# Patient Record
Sex: MALE | Race: WHITE
[De-identification: names, ages, dates, MRNs, and addresses within clinical notes are randomized per-mention and may not be internally consistent; named-entity substitution may affect disease eponyms.]

---

## 2017-01-03 ENCOUNTER — HOSPITAL ENCOUNTER (OUTPATIENT)
Dept: HOSPITAL 47 - ORPAIN | Age: 57
Discharge: HOME | End: 2017-01-03
Payer: COMMERCIAL

## 2017-01-03 VITALS — TEMPERATURE: 98.3 F | RESPIRATION RATE: 16 BRPM

## 2017-01-03 VITALS — HEART RATE: 69 BPM | SYSTOLIC BLOOD PRESSURE: 113 MMHG | DIASTOLIC BLOOD PRESSURE: 67 MMHG

## 2017-01-03 DIAGNOSIS — M47.816: Primary | ICD-10-CM

## 2017-01-03 PROCEDURE — 64635 DESTROY LUMB/SAC FACET JNT: CPT

## 2017-01-03 PROCEDURE — 64636 DESTROY L/S FACET JNT ADDL: CPT

## 2017-01-03 NOTE — FL
EXAMINATION TYPE: FL guided pain mgmt statistic

 

DATE OF EXAM: 1/3/2017 10:06 AM

 

COMPARISON: NONE

 

HISTORY: Back pain

 

Fluoroscopy support supplied to the referring clinician.  See dictated report from anesthesia, 14 sec
onds fluoroscopy time supplied, intraoperative C-arm image documents the procedure

## 2017-01-03 NOTE — P.PCN
Date of Procedure: 01/03/17


Preoperative Diagnosis: 


Lumbar spondylosis without myelopathy


Postoperative Diagnosis: 


Lumbar spondylosis without myelopathy or


Procedure(s) Performed: 


Left lumbar medial branch radiofrequency ablation under fluoroscopic guidance


Implants: 





Anesthesia: MAC


Surgeon: Mika Radford


Pathology: none sent


Condition: stable


Disposition: PACU


Indications for Procedure: 





Operative Findings: 





Description of Procedure: 


The patient was seen in preoperative holding area consent was obtained and was 

brought into the procedure room and placed in prone position.  Skin was prepped 

with ChloraPrep and draped in a sterile manner.  Lidocaine 1% was used to numb 

the skin over the target points that were chosen as follows: For the L5-S1 

level which corresponds to the dorsal ramus of L5 the target point was at the 

superior medial aspect of the sacral ala on the left side of the spine on the 

AP view of fluoroscopy.  For the L2-L3 and L4 medial branches the target points 

were the connection between the transverse process and the superior articular 

process of L3,L4 and L5 vertebra respectively on the left oblique view of 

fluoroscopy.  I Used 18  gauge 100 mm in length with 10 mm curved active tip 

radiofrequency ablation needles for this procedure.  I tried to place the 

active tips as parallel as possible to the medial branches tracks by going in a 

superior medial direction.  AP oblique and lateral views of fluoroscopy were 

obtained to verify needle tips position.  Motor stimulation showed only local 

twitches of these needles with no radiation of twitching to the left lower 

extremity after that I injected 1 mL of a solution made up of 3 mils Marcaine 

0.5% +40 mg of Kenalog.  Radio frequency ablation was started for 2 sessions 

for 90 seconds at 80C the needles were withdrawn by 1 or 2 mm and turning 180 

after the first session of ablation.  Patient tolerated procedure well.

## 2017-02-02 ENCOUNTER — HOSPITAL ENCOUNTER (OUTPATIENT)
Dept: HOSPITAL 47 - PNWHC3 | Age: 57
Discharge: HOME | End: 2017-02-02
Payer: COMMERCIAL

## 2017-02-02 VITALS
DIASTOLIC BLOOD PRESSURE: 79 MMHG | SYSTOLIC BLOOD PRESSURE: 140 MMHG | RESPIRATION RATE: 16 BRPM | TEMPERATURE: 98 F | HEART RATE: 70 BPM

## 2017-02-02 DIAGNOSIS — G89.29: Primary | ICD-10-CM

## 2017-02-02 DIAGNOSIS — M51.36: ICD-10-CM

## 2017-02-02 DIAGNOSIS — Z79.891: ICD-10-CM

## 2017-02-02 DIAGNOSIS — M47.816: ICD-10-CM

## 2017-02-02 DIAGNOSIS — M46.96: ICD-10-CM

## 2017-02-02 PROCEDURE — 99211 OFF/OP EST MAY X REQ PHY/QHP: CPT

## 2017-02-03 NOTE — P.PN
Subjective


   This is follow-up visit for this patient with a history of severe and 

chronic low back pain secondary to lumbar degenerative disc disease, lumbar 

facet arthropathy, we have done interventional pain management injection, 

radiofrequency ablation of the medial branch lumbar area, and this helped his 

low back pain significantly, and is currently on pain medications 


      1-MS Contin 60 mg every 8 hours      2-Percocet 5/325 every 6 hours when 

necessary for breakthrough pain      


     Patient denies any side effects of the medication, denies excessive 

drowsiness or sleepiness, denies suicidal ideation, and reports that the 

current pain medication is NOT helping  To  control the  pain and improve 

activity of daily living 


      the VAS  8 /10  without the medications ,and it  drope  to   3-4 /10 with 

the medication


    


     Physical Examinations  :


    1-Constitutiona           : Cooperative , not in acute distress .


     2-HEENT                  :  nech ;  supple ,  no Lymphadenopathy  , no 

Thyromegaly  , normal  thyroid  size .


                                                     eyes  :  no ptosis , no 

icterus,  no photophobia .  


                                                      ENT  : normal    of 

hearing  , normal  oropharynx     , no Thrush .  


    3- Respiratory            : Chest clear to auscultations Bilaterally  ,  no 

wheezing   , no Rhonchi   .  


    4- Cardiovascular      :  regular rate and rhythem , S1 ,  S2  ,   no  S3 ,

  no  S4.


    5- Gastrointestinal     : abdomen soft  no tenderness , bowel sounds 

positive all four quadrents   , no organomegally  .


    6- Genitourinary        :  Defferred .                                     

                                                                               

                                                                               

                                                                               

                                                                               

                   


    7- neurologic            :  Cranial nerve II   to  XII  intact ,  no   

focal neurological deffecit  .


    8-psychatric              : alert ,  oriented  X 3  ,   appropriate affect 

  , intact judgment  and insight  .  


   9-Lymphatic               :  no Lymphadenopathy .


  10- musculoskeltal      :     


        exams of the cervical spine =  motor strength normal  bilateral upper 

extremities 


                                                        facet loading test 

cervical area   positive.                                                      

                                                                               

                                                                               

                                                                               

                                                                               

                                            


        exams of the Lumber spine  =   motor strength  lower extremities ,thigh 

and legs  .4/5 


        deep tendon reflexes :   normal  Knee Jerk    , normal   ankle Jerk  .


        lumber facet Loading Test  positive 


        strait leg raising test   positive at  30  degree , RT ,LT   ,  


         Fabere test positive RT    and  positive  LT .


         Range of motion: Range of motion in flexion of the lumbar spine  30 

degrees


         Range of motion range of motion of extension of the lumbar spine 10





         Assessment and plan =


-        Chronic low back pain secondary to lumbar degenerative disc disease , 

lumbar spondylosis with facet arthropathy without myelopathy , 


-        chronic and current use of high-risk medication (Opioids).


         The patient was counseled about risk of opioid use, psychological risk 

associated with opioids and was orally counseled to not overuse , abuse , divert

,or sell dictations to take medications as prescribed only , and to restore 

medication in  safe location  ,                         


          and  the  patient counseled against driving while using narcotic 

medications, and also not to use alcohol or any illicit recreational drugs the 

patient's verbalized understanding that the lack of compliance will result in 

failure to renew narcotic prescription 


          and    possible discharge from the clinic                            

                                                                               

                                                                               

                                                                               

                                                                               

                                                                               

                                                                               

                                                                               

                                                     -       diagnoses, 

prognosis, and treatment options including but not limited to physical therapy, 

surgical interventions, interventional therapies and medication management 

including narcotics and adjuvant medication  were discussed with the patient 

and all    


         The questions answered





          -medication management =1-MS Contin  60 mg every 8 hours  dispense 90 

with 1 refill      2-  Percocet 5/325 every 6 hours dispensed 120 with one 

refill .  Next visit we'll do urine drug screen      


                                  


  








                                                  














Objective





- Vital Signs


Vital signs: 


 Vital Signs











Temp  98 F   02/02/17 14:27


 


Pulse  70   02/02/17 14:27


 


Resp  16   02/02/17 14:27


 


BP  140/79   02/02/17 14:27


 


Pulse Ox  97   02/02/17 14:27

## 2017-04-26 ENCOUNTER — HOSPITAL ENCOUNTER (OUTPATIENT)
Dept: HOSPITAL 47 - PNWHC3 | Age: 57
Discharge: HOME | End: 2017-04-26
Payer: COMMERCIAL

## 2017-04-26 VITALS
TEMPERATURE: 97.4 F | HEART RATE: 90 BPM | DIASTOLIC BLOOD PRESSURE: 83 MMHG | SYSTOLIC BLOOD PRESSURE: 141 MMHG | RESPIRATION RATE: 18 BRPM

## 2017-04-26 DIAGNOSIS — Z79.899: ICD-10-CM

## 2017-04-26 DIAGNOSIS — G89.29: ICD-10-CM

## 2017-04-26 DIAGNOSIS — Z79.891: ICD-10-CM

## 2017-04-26 DIAGNOSIS — M54.5: Primary | ICD-10-CM

## 2017-04-26 PROCEDURE — 99211 OFF/OP EST MAY X REQ PHY/QHP: CPT

## 2017-04-26 NOTE — P.PN
Progress Note - Text





This is a 57-year-old gentleman with history of chronic lower back pain with 

occasional radiation to the lower extremities.  The patient has failed back 

surgery syndrome.  His pain has been well-controlled with a combination of 

intervention pain procedures and oral opioids.  He has been on MS Contin 60 mg 

3 times a day and Percocet 5 mg up to 5 times a day for long time.  He denies 

any side effects to these medications he does not show any drug-seeking 

behavior.  He doesn't look oversedated.  He denies any suicidal thoughts..


Today I will continue her this pain treatment with MS Contin and Percocet with 

no changes and we'll see him 2 months from now.

## 2017-06-13 ENCOUNTER — HOSPITAL ENCOUNTER (EMERGENCY)
Dept: HOSPITAL 47 - EC | Age: 57
LOS: 1 days | Discharge: HOME | End: 2017-06-14
Payer: COMMERCIAL

## 2017-06-13 VITALS — RESPIRATION RATE: 18 BRPM

## 2017-06-13 DIAGNOSIS — Z90.49: ICD-10-CM

## 2017-06-13 DIAGNOSIS — Z79.1: ICD-10-CM

## 2017-06-13 DIAGNOSIS — Z87.442: ICD-10-CM

## 2017-06-13 DIAGNOSIS — Z79.899: ICD-10-CM

## 2017-06-13 DIAGNOSIS — R30.0: ICD-10-CM

## 2017-06-13 DIAGNOSIS — F17.290: ICD-10-CM

## 2017-06-13 DIAGNOSIS — R10.9: Primary | ICD-10-CM

## 2017-06-13 LAB
ALP SERPL-CCNC: 82 U/L (ref 38–126)
ALT SERPL-CCNC: 48 U/L (ref 21–72)
AMYLASE SERPL-CCNC: 73 U/L (ref 30–110)
ANION GAP SERPL CALC-SCNC: 12 MMOL/L
AST SERPL-CCNC: 31 U/L (ref 17–59)
BASOPHILS # BLD AUTO: 0 K/UL (ref 0–0.2)
BASOPHILS NFR BLD AUTO: 1 %
BUN SERPL-SCNC: 16 MG/DL (ref 9–20)
CALCIUM SPEC-MCNC: 9.4 MG/DL (ref 8.4–10.2)
CH: 33.7
CHCM: 36.7
CHLORIDE SERPL-SCNC: 105 MMOL/L (ref 98–107)
CO2 SERPL-SCNC: 23 MMOL/L (ref 22–30)
EOSINOPHIL # BLD AUTO: 0.2 K/UL (ref 0–0.7)
EOSINOPHIL NFR BLD AUTO: 3 %
ERYTHROCYTE [DISTWIDTH] IN BLOOD BY AUTOMATED COUNT: 4.24 M/UL (ref 4.3–5.9)
ERYTHROCYTE [DISTWIDTH] IN BLOOD: 12.9 % (ref 11.5–15.5)
GLUCOSE SERPL-MCNC: 106 MG/DL (ref 74–99)
HCT VFR BLD AUTO: 39.1 % (ref 39–53)
HDW: 3.01
HGB BLD-MCNC: 14.1 GM/DL (ref 13–17.5)
LUC NFR BLD AUTO: 3 %
LYMPHOCYTES # SPEC AUTO: 1.9 K/UL (ref 1–4.8)
LYMPHOCYTES NFR SPEC AUTO: 31 %
MCH RBC QN AUTO: 33.3 PG (ref 25–35)
MCHC RBC AUTO-ENTMCNC: 36.1 G/DL (ref 31–37)
MCV RBC AUTO: 92.3 FL (ref 80–100)
MONOCYTES # BLD AUTO: 0.3 K/UL (ref 0–1)
MONOCYTES NFR BLD AUTO: 4 %
NEUTROPHILS # BLD AUTO: 3.5 K/UL (ref 1.3–7.7)
NEUTROPHILS NFR BLD AUTO: 58 %
NON-AFRICAN AMERICAN GFR(MDRD): >60
POTASSIUM SERPL-SCNC: 4.1 MMOL/L (ref 3.5–5.1)
PROT SERPL-MCNC: 6.7 G/DL (ref 6.3–8.2)
SODIUM SERPL-SCNC: 140 MMOL/L (ref 137–145)
WBC # BLD AUTO: 0.16 10*3/UL
WBC # BLD AUTO: 6 K/UL (ref 3.8–10.6)
WBC (PEROX): 6.37

## 2017-06-13 PROCEDURE — 81001 URINALYSIS AUTO W/SCOPE: CPT

## 2017-06-13 PROCEDURE — 74000: CPT

## 2017-06-13 PROCEDURE — 85025 COMPLETE CBC W/AUTO DIFF WBC: CPT

## 2017-06-13 PROCEDURE — 80053 COMPREHEN METABOLIC PANEL: CPT

## 2017-06-13 PROCEDURE — 83690 ASSAY OF LIPASE: CPT

## 2017-06-13 PROCEDURE — 96374 THER/PROPH/DIAG INJ IV PUSH: CPT

## 2017-06-13 PROCEDURE — 96361 HYDRATE IV INFUSION ADD-ON: CPT

## 2017-06-13 PROCEDURE — 82150 ASSAY OF AMYLASE: CPT

## 2017-06-13 PROCEDURE — 96375 TX/PRO/DX INJ NEW DRUG ADDON: CPT

## 2017-06-13 PROCEDURE — 99285 EMERGENCY DEPT VISIT HI MDM: CPT

## 2017-06-13 PROCEDURE — 74176 CT ABD & PELVIS W/O CONTRAST: CPT

## 2017-06-13 PROCEDURE — 36415 COLL VENOUS BLD VENIPUNCTURE: CPT

## 2017-06-13 NOTE — XR
EXAM:

  XR Abdomen, 1 View

 

CLINICAL HISTORY:

  Reason: abdominal pain

 

TECHNIQUE:

  Frontal upright views of the abdomen/pelvis.

 

COMPARISON:

  No relevant prior studies available.

 

FINDINGS:

  Lower thorax:  There appears to be a small calcified left hilar node 

present.

  Gastrointestinal tract:  Mild to moderate amount of colonic stool 

throughout.  No grossly dilated bowel loops are seen although there do 

appear to be a few scattered air-fluid levels, probably colonic, on these 

upright views.

  Organs:  Right upper quadrant clips suggest cholecystectomy.

  Bones/joints:  Multilevel degenerative changes, with slight leftward 

curvature centered at L1-2.

 

IMPRESSION:     

1.  Nonspecific bowel gas pattern that includes scattered air-fluid 

levels without significant bowel distention to suggest obstruction, 

perhaps on the basis of ongoing ileus.  The findings could be correlated 

and followed clinically to guide further imaging follow-up as clinically 

indicated.

2.  No evidence of free air.
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
## 2017-06-14 VITALS — DIASTOLIC BLOOD PRESSURE: 78 MMHG | SYSTOLIC BLOOD PRESSURE: 110 MMHG | HEART RATE: 56 BPM | TEMPERATURE: 98.7 F

## 2017-06-14 LAB
PARTICLE COUNT: 3262
PH UR: 5 [PH] (ref 5–8)
RBC UR QL: 14 /HPF (ref 0–5)
SP GR UR: 1.02 (ref 1–1.03)
UA BILLING (MACRO VS. MICRO): (no result)
UROBILINOGEN UR QL STRIP: <2 MG/DL (ref ?–2)
WBC #/AREA URNS HPF: 1 /HPF (ref 0–5)

## 2017-06-14 NOTE — CT
EXAM:

  CT Abdomen and Pelvis Without Intravenous Contrast

 

CLINICAL HISTORY:

  Reason: Left flank pain

 

TECHNIQUE:

  Axial computed tomography images of the abdomen and pelvis without 

intravenous contrast.  CTDI is 14.90 mGy and DLP is 734.70 mGy-cm.  This 

CT exam was performed using one or more of the following dose reduction 

techniques: automated exposure control, adjustment of the mA and/or kV 

according to patient size, and/or use of iterative reconstruction 

technique.

 

COMPARISON:

  Earlier [facility].

 

FINDINGS:

  Lower thorax:  Minimal peripheral atelectasis or scarring at the lung 

bases.  Coronary artery calcification.  Mildly elevated left diaphragm.

 

 ABDOMEN:

  Liver:  Diffuse hepatic steatosis.

  Gallbladder and bile ducts:  Prior cholecystectomy.  No ductal dilation.

 

  Pancreas: Mild generalized fatty replacement of the pancreas.

  Spleen:  No splenomegaly.

  Adrenals:  Unremarkable.  No mass.

  Kidneys and ureters:  No obstructing stones.  No hydronephrosis.

  Stomach and bowel:  Dilated air and fluid-filled small bowel loops 

measuring up to 5.7 cm in craniocaudal span on coronal image 27, 

anteriorly within the mid and lower abdomen, where there is a small left 

supraumbilical ventral wall hernia that contains a small portion of one 

small bowel loop within, adjacent to which small bowel is nondilated.  

Likewise, remaining small bowel including distally within the pelvis and 

right lower quadrant, is nondilated, nor is the colon.

  Appendix:  No findings to suggest acute appendicitis.

 

 PELVIS:

  Bladder:  Unremarkable.  No stones.

  Reproductive:  Small calcification centrally within normal size 

prostate gland.

 

 ABDOMEN and PELVIS:

  Intraperitoneal space:  No free air.  No significant fluid collection.

  Bones/joints:  Multilevel degenerative changes.  No acute fracture.  

  Soft tissues: Small fat-containing umbilical hernia.

  Vasculature:  Small amounts of atherosclerotic calcification.  No 

abdominal aortic aneurysm.

  Lymph nodes:  Scattered mildly prominent mesenteric nodes with 

surrounding mesenteric haziness.

 

IMPRESSION:     

1.  There are several dilated small bowel loops suggesting the presence 

of a small bowel obstruction, with one of the loops slightly protruding 

into a left ventral wall hernia, perhaps the etiology or on the basis of 

adhesions.

2.  Mildly prominent mesenteric nodes with surrounding mesenteric 

haziness suggesting a nonspecific mesenteritis.  Follow-up CT could be 

obtained within 3 months to reassess.

3.  Additional findings as above.

## 2017-06-21 ENCOUNTER — HOSPITAL ENCOUNTER (OUTPATIENT)
Dept: HOSPITAL 47 - PNWHC3 | Age: 57
Discharge: HOME | End: 2017-06-21
Attending: ANESTHESIOLOGY
Payer: COMMERCIAL

## 2017-06-21 VITALS
RESPIRATION RATE: 16 BRPM | SYSTOLIC BLOOD PRESSURE: 138 MMHG | HEART RATE: 66 BPM | TEMPERATURE: 98.1 F | DIASTOLIC BLOOD PRESSURE: 82 MMHG

## 2017-06-21 DIAGNOSIS — Z79.899: ICD-10-CM

## 2017-06-21 DIAGNOSIS — M47.816: ICD-10-CM

## 2017-06-21 DIAGNOSIS — M46.1: Primary | ICD-10-CM

## 2017-06-21 DIAGNOSIS — Z72.0: ICD-10-CM

## 2017-06-21 DIAGNOSIS — G89.4: ICD-10-CM

## 2017-06-21 PROCEDURE — 80356 HEROIN METABOLITE: CPT

## 2017-06-21 PROCEDURE — 99211 OFF/OP EST MAY X REQ PHY/QHP: CPT

## 2017-06-21 PROCEDURE — 80348 DRUG SCREENING BUPRENORPHINE: CPT

## 2017-06-21 PROCEDURE — 80307 DRUG TEST PRSMV CHEM ANLYZR: CPT

## 2017-06-21 NOTE — P.PN
Progress Note - Text


Patient returns for followup for chronic back pain with radiation to lower 

extremities bilaterally.  Patient underwent bilateral lumbar RFA in November 

and January, which provided some relief for a short interval.  Patient 

continues on MSContin and Percocet medications for pain with decent relief.  

Patient denies adverse drug effects from medications.  Today, pt denies new-

onset weakness, bowel/bladder incontinence, or any other signs or symptoms of 

cauda equina syndrome.





Patient's primary complaint is low back and hip pain, worse on right than left 

side.  This pain prevents him from sitting or standing for long periods of 

time.  





In addition to above, 13-point review of systems is also negative for chest pain

, shortness of breath, changes in vision, changes in hearing, new onset weakness

, abdominal pain, diarrhea, extreme fatigue, malaise, fever, skin changes, 

homicidal or suicidal ideation, or bowel or bladder incontinence.





Gen:  WDWN, AAOx3, NAD


HEENT:  NCAT, EOMI, hearing grossly normal


Pulm:  resp unlabored


Abd:  soft, NT, ND





Neck:  supple, trachea midline





ROM in flexion lumbar spine:  reduced


ROM in extension lumbar spine: reduced due to pain


Lumbar paravertebral tenderness: L > R    


Facet loading:  + bilaterally, L > R


SI joint tenderness: + L side, neg R side 


Avery's test:  ++ L side, + R side


Straight leg raise:  neg bilaterally





Neuro:  CN II-XII grossly intact, muscle strength lower extremities PRESERVED





Assessment:


1. sacroiliitis


2. lumbar spondylosis without myelopathy


3. chronic pain syndrome





Plan:





1. Explanation:  Opioid and psychological risk scores were reviewed.  Diagnoses

, prognoses, and multiple treatment options including but not limited to 

physical therapy, interventional therapies, adjuvant medical therapies, 

narcotic medication therapies, and surgery were discussed with the patient and 

all questions were answered to the patient's satisfaction.





2.  Opioid agreement: Patient signed narcotic agreement, and was orally 

counseled to not overuse, abuse, divert, or cell medications, and to take them 

as prescribed by only 1 healthcare provider.  The patient was also counseled to 

take medications as prescribed by only 1 healthcare provider and to store 

opioid medications in the safe and preferably locked location.  Patient was 

also counseled against driving while using narcotic medications and also to not 

use alcohol or any illicit or recreational drugs.  The patient verbalized 

understanding that lack of compliance with any of the above and likely result 

in failure to renew narcotic prescriptions, possible discharge from the clinic, 

and possible legal ramifications thereafter if indicated.





3. Counseling:  The patient was counseled extensively on SMOKING CESSATION, 

BODY MASS INDEX, EXERCISE, and ALCOHOL use.  Specifically, the patient was 

instructed regarding the importance of smoking cessation, obesity, and exercise 

in the context of both chronic pain and overall health.





4. Procedures:  R lumbar RFA





5. Consultations: None  





6. Investigations: None





7. Medications:    Decreased Percocet to 5/325 #120 and maintained MSContin 60 

mg #90.  Urine drug screen today.  Told patient he is well above the 

recommended 90 oral MME, and we will plan to decrease Percocet to #90 over the 

next few months.  After this, we will begin decreasing his MSContin dose.





PQRS measures:





1-Patient's medications are documented in the chart.


2-Tobacco use is positive, counseling given


3-Patient has not had a pneumococcal vaccine.


4-Advanced care planning discussed, patient not eligible.


5-Opioid contract signed with the patient.


6-Pain positive, follow-up visit or procedure scheduled


7-Patient's blood pressure measured and documented, and normal.  


8-Patient's weight was measured, and body mass index ABOVE the normal limits, 

and counseling was done.  Patient instructed to follow up with PCP.


9-Patient WAS NOT identified as an unhealthy alcohol user.

## 2017-06-28 LAB — MIS TEST REQUESTED (NON-BLOOD): (no result)

## 2017-08-01 ENCOUNTER — HOSPITAL ENCOUNTER (OUTPATIENT)
Dept: HOSPITAL 47 - ORPAIN | Age: 57
Discharge: HOME | End: 2017-08-01
Payer: COMMERCIAL

## 2017-08-01 VITALS — TEMPERATURE: 98.4 F | RESPIRATION RATE: 16 BRPM

## 2017-08-01 VITALS — BODY MASS INDEX: 28.8 KG/M2

## 2017-08-01 VITALS — DIASTOLIC BLOOD PRESSURE: 70 MMHG | SYSTOLIC BLOOD PRESSURE: 126 MMHG

## 2017-08-01 VITALS — HEART RATE: 69 BPM

## 2017-08-01 DIAGNOSIS — M47.816: Primary | ICD-10-CM

## 2017-08-01 PROCEDURE — 64635 DESTROY LUMB/SAC FACET JNT: CPT

## 2017-08-01 PROCEDURE — 99153 MOD SED SAME PHYS/QHP EA: CPT

## 2017-08-01 PROCEDURE — 64636 DESTROY L/S FACET JNT ADDL: CPT

## 2017-08-01 PROCEDURE — 80356 HEROIN METABOLITE: CPT

## 2017-08-01 PROCEDURE — 99152 MOD SED SAME PHYS/QHP 5/>YRS: CPT

## 2017-08-01 NOTE — P.PCN
Date of Procedure: 08/01/17


Preoperative Diagnosis: 


                           Lumbar spondylosis without myelopathy


Postoperative Diagnosis: 


Same as above


Procedure(s) Performed: 


Right lumbar medial branch radio frequency ablation under fluoroscopic guidance 

for levels L2, L3, L4, and the dorsal ramus of L5


Implants: 





Anesthesia: other (Moderate conscious sedation with IV Versed and fentanyl)


Surgeon: Mika Radford


Pathology: none sent


Condition: stable


Disposition: PACU


Indications for Procedure: 





Operative Findings: 





Description of Procedure: 


The patient was seen in preoperative holding area consent was obtained then he 

was brought into the procedure room and placed in prone position.  Skin was 

prepped with ChloraPrep and draped in a sterile manner.  Lidocaine 1% was used 

to numb the skin up at the target points that were chosen as follows: For the L5

-S1 level which corresponds to the dorsal ramus of L5 the target point was at 

the superior medial aspect of the sacral ala on the right side of the spine on 

the AP view of fluoroscopy and for the L2,L3, and L4 medial branches the target 

points were the connection between the transverse process and the superior 

articular process of L3, for this procedure.  L4 and L5 vertebra respectively 

on the right oblique fluoroscopy.  I used 18G,100 mm in length with 10 mm 

curved active tip radiofrequency ablation needles.  AP, oblique, and lateral 

views of fluoroscopy were used to verify needle tips position.  Then motor 

stimulation showed only local twitches of these needles with no radiation of 

twitching to the right lower extremity.  I then prepared a solution of 3 mils 

of Marcaine 0.5% +40 mg of Kenalog and 1 mL of the solution was given in each 

needle before starting radiofrequency ablation for 90 seconds at 80C.  After 

the first session was done the needles were withdrawn by 1 or 2 mm and the 

bevels were turned 100 and another session ablation was started for 90 seconds 

at 80C.  Patient tolerated procedure well.

## 2017-08-01 NOTE — FL
Fluoroscopy

 

INDICATION: Pain

 

FINDINGS:

 

Fluoroscopy time: 11 seconds.

 

Images obtained: 1.

 

IMPRESSIONS:

1. Documentation of fluoroscopy.

## 2017-08-03 LAB — MIS TEST REQUESTED (NON-BLOOD): (no result)

## 2017-08-16 ENCOUNTER — HOSPITAL ENCOUNTER (OUTPATIENT)
Dept: HOSPITAL 47 - PNWHC3 | Age: 57
End: 2017-08-16
Attending: ANESTHESIOLOGY
Payer: COMMERCIAL

## 2017-08-16 VITALS
TEMPERATURE: 98 F | DIASTOLIC BLOOD PRESSURE: 74 MMHG | SYSTOLIC BLOOD PRESSURE: 143 MMHG | HEART RATE: 69 BPM | RESPIRATION RATE: 18 BRPM

## 2017-08-16 DIAGNOSIS — M46.1: ICD-10-CM

## 2017-08-16 DIAGNOSIS — Z79.899: ICD-10-CM

## 2017-08-16 DIAGNOSIS — Z79.891: ICD-10-CM

## 2017-08-16 DIAGNOSIS — M47.816: Primary | ICD-10-CM

## 2017-08-16 PROCEDURE — 80364 OPIOID &OPIATE ANALOG 5/MORE: CPT

## 2017-08-16 PROCEDURE — 80346 BENZODIAZEPINES1-12: CPT

## 2017-08-16 PROCEDURE — 80307 DRUG TEST PRSMV CHEM ANLYZR: CPT

## 2017-08-16 PROCEDURE — 99211 OFF/OP EST MAY X REQ PHY/QHP: CPT

## 2017-08-16 PROCEDURE — 80356 HEROIN METABOLITE: CPT

## 2017-08-16 NOTE — P.PN
Progress Note - Text


Patient returns for followup for chronic back pain with radiation to lower 

extremities bilaterally.  Patient underwent right lumbar RFA at last visit, 

which provided some relief for interval since procedure done two weeks ago.  

Patient continues on MSContin and Percocet medications for pain with decent 

relief; Percocet decreased to #120 at last visit.  Patient denies adverse drug 

effects from medications.  Today, pt denies new-onset weakness, bowel/bladder 

incontinence, or any other signs or symptoms of cauda equina syndrome.





Patient's primary complaint is low back and hip pain, worse on right than left 

side.  This pain prevents him from sitting or standing for long periods of 

time.  





In addition to above, 13-point review of systems is also negative for chest pain

, shortness of breath, changes in vision, changes in hearing, new onset weakness

, abdominal pain, diarrhea, extreme fatigue, malaise, fever, skin changes, 

homicidal or suicidal ideation, or bowel or bladder incontinence.





Gen:  WDWN, AAOx3, NAD


HEENT:  NCAT, EOMI, hearing grossly normal


Pulm:  resp unlabored


Abd:  soft, NT, ND





Neck:  supple, trachea midline





ROM in flexion lumbar spine:  reduced


ROM in extension lumbar spine: reduced due to pain


Lumbar paravertebral tenderness: L > R    


Facet loading:  + bilaterally, L >> R


SI joint tenderness: + L side


Avery's test:  + bilateral





Neuro:  CN II-XII grossly intact, muscle strength lower extremities PRESERVED





Assessment:


1. sacroiliitis


2. lumbar spondylosis without myelopathy


3. chronic pain syndrome





Plan:





1. Explanation:  Opioid and psychological risk scores were reviewed.  Diagnoses

, prognoses, and multiple treatment options including but not limited to 

physical therapy, interventional therapies, adjuvant medical therapies, 

narcotic medication therapies, and surgery were discussed with the patient and 

all questions were answered to the patient's satisfaction.





2.  Opioid agreement: Patient signed narcotic agreement, and was orally 

counseled to not overuse, abuse, divert, or cell medications, and to take them 

as prescribed by only 1 healthcare provider.  The patient was also counseled to 

take medications as prescribed by only 1 healthcare provider and to store 

opioid medications in the safe and preferably locked location.  Patient was 

also counseled against driving while using narcotic medications and also to not 

use alcohol or any illicit or recreational drugs.  The patient verbalized 

understanding that lack of compliance with any of the above and likely result 

in failure to renew narcotic prescriptions, possible discharge from the clinic, 

and possible legal ramifications thereafter if indicated.





3. Counseling:  The patient was counseled extensively on SMOKING CESSATION, 

BODY MASS INDEX, EXERCISE, and ALCOHOL use.  Specifically, the patient was 

instructed regarding the importance of smoking cessation, obesity, and exercise 

in the context of both chronic pain and overall health.





4. Procedures:  left lumbar RFA





5. Consultations: None  





6. Investigations: UDS negative for oxycodone on 8/1/17





7. Medications:    Maintained Percocet at 5/325 #120 and maintained MSContin 60 

mg #90.  Urine drug screen today.  Told patient he is well above the 

recommended 90 oral MME, and we will plan to decrease Percocet to #90 over the 

next few months.  After this, we will begin decreasing his MSContin dose.





PQRS measures:





1-Patient's medications are documented in the chart.


2-Tobacco use is positive, counseling given


3-Patient has not had a pneumococcal vaccine.


4-Advanced care planning discussed, patient not eligible.


5-Opioid contract signed with the patient.


6-Pain positive, follow-up visit or procedure scheduled


7-Patient's blood pressure measured and documented, and normal.  


8-Patient's weight was measured, and body mass index ABOVE the normal limits, 

and counseling was done.  Patient instructed to follow up with PCP.


9-Patient WAS NOT identified as an unhealthy alcohol user.

## 2017-08-22 LAB — MIS TEST REQUESTED (NON-BLOOD): (no result)

## 2017-08-30 ENCOUNTER — HOSPITAL ENCOUNTER (OUTPATIENT)
Dept: HOSPITAL 47 - ORPAIN | Age: 57
Discharge: HOME | End: 2017-08-30
Payer: COMMERCIAL

## 2017-08-30 VITALS — HEART RATE: 62 BPM | DIASTOLIC BLOOD PRESSURE: 70 MMHG | SYSTOLIC BLOOD PRESSURE: 103 MMHG

## 2017-08-30 VITALS — RESPIRATION RATE: 18 BRPM

## 2017-08-30 VITALS — TEMPERATURE: 98.3 F

## 2017-08-30 VITALS — BODY MASS INDEX: 28.1 KG/M2

## 2017-08-30 DIAGNOSIS — M47.816: Primary | ICD-10-CM

## 2017-08-30 PROCEDURE — 99152 MOD SED SAME PHYS/QHP 5/>YRS: CPT

## 2017-08-30 PROCEDURE — 99153 MOD SED SAME PHYS/QHP EA: CPT

## 2017-08-30 PROCEDURE — 64635 DESTROY LUMB/SAC FACET JNT: CPT

## 2017-08-30 PROCEDURE — 64636 DESTROY L/S FACET JNT ADDL: CPT

## 2017-08-30 NOTE — FL
EXAMINATION TYPE: FL guided pain mgmt statistic

 

DATE OF EXAM: 8/30/2017

 

COMPARISON: NONE

 

HISTORY: Back pain

 

TECHNIQUE: Fluoroscopy.

 

FINDINGS/IMPRESSION:  Fluoroscopic guidance was provided during procedure performed by Dr. Radford.  
A total of 13 seconds of fluoroscopic time was utilized during the procedure and for spot images was 
acquired.

## 2017-08-30 NOTE — P.PCN
Date of Procedure: 08/30/17


Preoperative Diagnosis: 


 lumbar Spondylosis without myelopathy


Postoperative Diagnosis: 


Same as above


Procedure(s) Performed: 


Left lumbar medial branch radiofrequency ablation under fluoroscopic guidance


Implants: 





Anesthesia: other (Conscious  sedation with IV fentanyl and Versed)


Surgeon: Mika Radford


Pathology: none sent


Condition: stable


Disposition: PACU


Indications for Procedure: 





Operative Findings: 





Description of Procedure: 


The patient was seen in preoperative holding area consent was obtained then he 

was brought into the procedure room and placed in prone position.  Skin was 

prepped with ChloraPrep and draped in a sterile manner.  Lidocaine 1% was used 

to numb the skin up at the target points that were chosen as follows: For the L5

-S1 level which corresponds to the dorsal ramus of L5 the target point was at 

the superior medial aspect of the sacral ala on the left side of the spine on 

the AP view of fluoroscopy and for the L2,L3, and L4 medial branches the target 

points were the connection between the transverse process and the superior 

articular process of L3,  L4 and L5 vertebra respectively on the left oblique 

fluoroscopy.  I used 18G,100 mm in length with 10 mm curved active tip 

radiofrequency ablation needles.  AP, oblique, and lateral views of fluoroscopy 

were used to verify needle tips position.  Then motor stimulation showed only 

local twitches of these needles with no radiation of twitching to the left 

lower extremity.  I then prepared a solution of 3 mils of Marcaine 0.5% +40 mg 

of Kenalog and 1 mL of the solution was given in each needle before starting 

radiofrequency ablation for 90 seconds at 80C.  After the first session was 

done the needles were withdrawn by 1 or 2 mm and the bevels were turned 100 

and another session ablation was started for 90 seconds at 80C.  Patient 

tolerated procedure well.

## 2017-09-05 NOTE — CDI
Dear Dr. Radford,



In order to properly code and bill, we need more specificity regarding the 
levels treated. 



"To report these procedures appropriately, physicians must clearly document the 
following: vertebral region level(s) (eg, cervical, thoracic, lumbar, etc.), 
and the facet joints (eg. L3-L4, L4-L5) involved, and whether the procedure(s) 
is unilateral or bilateral. Although the number of nerves and/or lesions might 
be noted in the clinical note, these factors do not influence the code 
selection or the number of units reported."-CPT Asst



In summary, the coding of this procedure is based on number of facet joints 
treated, not number of nerves treated. The facet joints must be reported as L2-
L3, L3-L4, L4-L5, L5-S1, for example. Please state the injections in terms of 
facet joint levels as an addendum to your op report.



Thank you,



Annalisa CHAVEZ

## 2017-10-11 ENCOUNTER — HOSPITAL ENCOUNTER (OUTPATIENT)
Dept: HOSPITAL 47 - PNWHC3 | Age: 57
Discharge: HOME | End: 2017-10-11
Payer: COMMERCIAL

## 2017-10-11 VITALS — HEART RATE: 77 BPM | DIASTOLIC BLOOD PRESSURE: 78 MMHG | RESPIRATION RATE: 16 BRPM | SYSTOLIC BLOOD PRESSURE: 138 MMHG

## 2017-10-11 DIAGNOSIS — M46.86: ICD-10-CM

## 2017-10-11 DIAGNOSIS — M47.816: Primary | ICD-10-CM

## 2017-10-11 PROCEDURE — 99211 OFF/OP EST MAY X REQ PHY/QHP: CPT

## 2017-10-11 NOTE — P.PN
Subjective


Progress Note Date: 10/11/17


     This is follow-up visit for this patient with a history of severe and 

chronic low back pain secondary to , lumbar spondylosis with  facet arthropathy

, we have done interventional pain management injection, radiofrequency 

ablation of the medial branch lumbar area which helped his lower extremity 

significantly and continued to have some low back pain, is currently


  On pain medication, 


      1-MS Contin 60 mg every 8 hours


      2-Percocet 5/325 every 6 hours when necessary for pain


      3-Motrin 600 mg every 8 hours


     Patient denies any side effects of the medication, denies excessive 

drowsiness or sleepiness, denies suicidal ideation,   and reports that the 

current pain medication is NOT helping  To  control the  pain and improve 

activity of daily living 


     Patient denies any motor or sensory deficit , patient denies any fever or 

night sweats, denies any change in the bowel movements or urination


    


     Physical Examinations  :


    1-Constitutiona           : Cooperative , not in acute distress .


     2-HEENT                  :  nech ;  supple ,  no Lymphadenopathy  , no 

Thyromegaly  , normal  thyroid  size .


                                                     eyes  :  no ptosis , no 

icterus,  no photophobia .  


                                                      ENT  : normal    of 

hearing  , normal  oropharynx     , no Thrush .  


    3- Respiratory            : Chest clear to auscultations Bilaterally  ,  no 

wheezing   , no Rhonchi   .  


    4- Cardiovascular      :  regular rate and rhythem , S1 ,  S2  ,   no  S3 ,

  no  S4.


    5- Gastrointestinal     : abdomen soft  no tenderness , bowel sounds 

positive all four quadrents   , no organomegally  .


    6- Genitourinary        :  Defferred .                                     

                                                                               

                                                                               

                                                                               

                                                                               

                   


    7- neurologic            :  Cranial nerve II   to  XII  intact ,  no   

focal neurological deffecit  .


    8-psychatric              : alert ,  oriented  X 3  ,   appropriate affect 

  , intact judgment  and insight  .  


   9-Lymphatic               :  no Lymphadenopathy .


  10- musculoskeltal      :                                                    

                                                                               

                                                                               

                                                                               

                                                                               

                                  


                                          exams of the Lumber spine  =  motor 

strength  lower extremities ,thigh and legs  .5/5 


                                                                               

          deep tendon reflexes :   normal  Knee Jerk    , normal   ankle Jerk  .


                                                                               

          lumber facet Loading Test  positive 


                                                                               

          strait leg raising test   positive at  30  degree , RT ,LT   ,  


                                                                               

          Fabere test positive RT    and  positive  LT .


                                                                               

          Range of motion: Range of motion in flexion of the lumbar spine  30 

degrees


                                                                               

          Range of motion range of motion of extension of the lumbar spine 10 


        assement and plan =                                                    

                             


       Chronic low back pain secondary to, lumbar spondylosis with facet 

arthropathy without myelopathy , 


       chronic and current use of high-risk medication (Opioids).


       The patient was counseled about risk of opioid use, psychological risk 

associated with opioids and was orally counseled to not overuse ,


       divert,or sell dictations to take medications as prescribed only , and 

to restore medication in  safe location  ,                         


       and  the  patient counseled against driving while using narcotic 

medications, and also not to use alcohol or any illicit recreational drugs,  


       the patient's verbalized understanding that the lack of compliance will 

result in failure to renew narcotic prescription 


       and    possible discharge from the clinic                               

                                                                               

                                                                               

                                                                               

                                                                               

                                                                               

                                                                               

                                                                               

                                                  -      diagnoses, prognosis, 

and treatment options including but not limited to physical therapy, surgical 

interventions, interventional therapies ,  


       and medication management including narcotics and adjuvant medication  

were discussed with the patient and all the questions answered  


      Prescription refills for MS Contin 60 mg every 8 hours dispense 90 with 1 

refill Percocet 5/325 every 6 hours dispense 120 with one refill, Motrin 600 mg 

every 8 hours dispense 90 with 1


      refill , and he will follow up in the pain clinic in 2 months


                                                  











Objective





- Vital Signs


Vital signs: 


 Vital Signs











Temp      


 


Pulse  77   10/11/17 13:39


 


Resp  16   10/11/17 13:39


 


BP  138/78   10/11/17 13:39


 


Pulse Ox  98   10/11/17 13:39








 Intake & Output











 10/10/17 10/11/17 10/11/17





 18:59 06:59 18:59


 


Weight   83.915 kg

## 2018-01-02 ENCOUNTER — HOSPITAL ENCOUNTER (OUTPATIENT)
Dept: HOSPITAL 47 - PNWHC3 | Age: 58
Discharge: HOME | End: 2018-01-02
Payer: COMMERCIAL

## 2018-01-02 DIAGNOSIS — M46.96: ICD-10-CM

## 2018-01-02 DIAGNOSIS — M25.511: ICD-10-CM

## 2018-01-02 DIAGNOSIS — M46.86: ICD-10-CM

## 2018-01-02 DIAGNOSIS — M47.816: ICD-10-CM

## 2018-01-02 DIAGNOSIS — M51.36: Primary | ICD-10-CM

## 2018-01-02 PROCEDURE — 99211 OFF/OP EST MAY X REQ PHY/QHP: CPT

## 2018-01-02 NOTE — P.PN
Subjective


Progress Note Date: 01/02/18


     This is follow-up visit for this patient with a history of severe and 

chronic low back pain secondary to lumbar degenerative disc diseases , lumbar 

spondylosis with  facet arthropathy, we have done  interventional pain 

management injection, facet ablation of the medial branch lumbar area,


      Patients currently on 


      1- MS Contin 60 mg every 8 hours


      2- percocet 5/325 q 6 h 


      3- motrin 600 mg 3 times a day


     Patient denies any side effects of the medication, denies excessive 

drowsiness or sleepiness, denies suicidal ideation,   and reports that the 

current pain medication is  helping  To 


     control the  pain ,and improve activity of daily living 


     Patient denies any motor or sensory deficit , patient denies any fever or 

night sweats, denies any change in the bowel movements or urination, and heat 

have severe right shoulder pain after he fell on his right elbow , he was 

schedualed to see orthopedic surgeon the next few weeks


    


     Physical Examinations  :


    1-Constitutiona           : Cooperative , not in acute distress .


     2-HEENT                  :  nech ;  supple ,  no Lymphadenopathy  , no 

Thyromegaly  , normal  thyroid  size .


                                             eyes  :  no ptosis , no icterus,  

no photophobia .  


                                             ENT  : normal    of hearing  , 

normal  oropharynx     , no Thrush .  


    3- Respiratory            : Chest clear to auscultations Bilaterally  ,  no 

wheezing   , no Rhonchi   .  


    4- Cardiovascular      :  regular rate and rhythem , S1 ,  S2  ,   no  S3 ,

  no  S4.


    5- Gastrointestinal     : abdomen soft  no tenderness , bowel sounds 

positive all four quadrents   , no organomegally  .


    6- Genitourinary        :  Defferred .                                     

                                                                               

                                                                               

                                                                               

                                                                               

                   


    7- neurologic            :  Cranial nerve II   to  XII  intact ,  no   

focal neurological deffecit  .


    8-psychatric              : alert ,  oriented  X 3  ,   appropriate affect 

  , intact judgment  and insight  .  


   9-Lymphatic               :  no Lymphadenopathy .


  10- musculoskeltal      :     


                                         exams of the cervical spine =  motor 

strength normal  bilateral upper extremities 


                                                                               

                      passive and Active movement of the right shoulder 

associated with severe pain                                                    

                                                                               

                                                                               

                                                                               

                                                                               

                          


                                          exams of the Lumber spine  =  motor 

strength  lower extremities ,thigh and legs  .5/5 


                                                                               

          deep tendon reflexes :   normal  Knee Jerk    , normal   ankle Jerk  .


                                                                               

          lumber facet Loading Test  positive 


                                                                               

          strait leg raising test   positive at  30  degree , RT ,LT   ,  


                                                                               

          Fabere test positive RT    and  positive  LT .


                                                                               

          Range of motion: Range of motion in flexion of the lumbar spine  30 

degrees


                                                                               

          Range of motion range of motion of extension of the lumbar spine 10 


      Assessment and plan =


       Chronic low back pain secondary to lumbar degenerative disc disease , 

lumbar spondylosis with facet arthropathy without myelopathy , 


      right shoulder pain 


       chronic and current use of high-risk medication (Opioids).


       The patient was counseled about risk of opioid use, psychological risk 

associated with opioids and was orally counseled to not overuse ,


       divert,or sell dictations to take medications as prescribed only , and 

to restore medication in  safe location  ,                         


       and  the  patient counseled against driving while using narcotic 

medications, and also not to use alcohol or any illicit recreational drugs,  


       the patient's verbalized understanding that the lack of compliance will 

result in failure to renew narcotic prescription 


       and    possible discharge from the clinic                               

                                                                               

                                                                               

                                                                               

                                                                               

                                                                               

                                                                               

                                                                               

                                                  -      diagnoses, prognosis, 

and treatment options including but not limited to physical therapy, surgical 

interventions, interventional therapies ,  


       and medication management including narcotics and adjuvant medication  

were discussed with the patient and all the questions answered  


      She was scheduled to see Dr. Newsome orthopedic surgeon in the next few 

weeks and he will be evaluated regarding his shoulder pain.


      Event prescription refill for the pain medication Percocet 5/325 was 

dispensed 120 with one refill , and MS Contin 60 mg every 8 hours dispense 90 

with 1 refill


                                                  











Objective





- Vital Signs


Vital signs: 


 Vital Signs











Temp      


 


Pulse  68   01/02/18 14:18


 


Resp  16   01/02/18 14:18


 


BP  128/79   01/02/18 14:18


 


Pulse Ox  96   01/02/18 14:18








 Intake & Output











 01/01/18 01/02/18 01/02/18





 18:59 06:59 18:59


 


Weight   86.183 kg

## 2018-01-03 VITALS — SYSTOLIC BLOOD PRESSURE: 128 MMHG | DIASTOLIC BLOOD PRESSURE: 79 MMHG | HEART RATE: 68 BPM | RESPIRATION RATE: 16 BRPM

## 2018-02-27 ENCOUNTER — HOSPITAL ENCOUNTER (OUTPATIENT)
Dept: HOSPITAL 47 - PNWHC3 | Age: 58
Discharge: HOME | End: 2018-02-27
Attending: ANESTHESIOLOGY
Payer: COMMERCIAL

## 2018-02-27 VITALS — HEART RATE: 80 BPM | SYSTOLIC BLOOD PRESSURE: 151 MMHG | RESPIRATION RATE: 16 BRPM | DIASTOLIC BLOOD PRESSURE: 82 MMHG

## 2018-02-27 DIAGNOSIS — G89.4: Primary | ICD-10-CM

## 2018-02-27 DIAGNOSIS — M47.816: ICD-10-CM

## 2018-02-27 DIAGNOSIS — M46.1: ICD-10-CM

## 2018-02-27 DIAGNOSIS — Z79.891: ICD-10-CM

## 2018-02-27 PROCEDURE — 99211 OFF/OP EST MAY X REQ PHY/QHP: CPT

## 2018-07-21 ENCOUNTER — HOSPITAL ENCOUNTER (EMERGENCY)
Dept: HOSPITAL 47 - EC | Age: 58
Discharge: HOME | End: 2018-07-21
Payer: COMMERCIAL

## 2018-07-21 VITALS
TEMPERATURE: 97.6 F | DIASTOLIC BLOOD PRESSURE: 68 MMHG | RESPIRATION RATE: 16 BRPM | HEART RATE: 73 BPM | SYSTOLIC BLOOD PRESSURE: 112 MMHG

## 2018-07-21 DIAGNOSIS — K43.9: Primary | ICD-10-CM

## 2018-07-21 DIAGNOSIS — Z79.899: ICD-10-CM

## 2018-07-21 DIAGNOSIS — Z90.49: ICD-10-CM

## 2018-07-21 DIAGNOSIS — Z79.891: ICD-10-CM

## 2018-07-21 DIAGNOSIS — G89.29: ICD-10-CM

## 2018-07-21 DIAGNOSIS — Z79.1: ICD-10-CM

## 2018-07-21 LAB
ALBUMIN SERPL-MCNC: 4.1 G/DL (ref 3.5–5)
ALP SERPL-CCNC: 82 U/L (ref 38–126)
ALT SERPL-CCNC: 47 U/L (ref 21–72)
AMYLASE SERPL-CCNC: 61 U/L (ref 30–110)
ANION GAP SERPL CALC-SCNC: 10 MMOL/L
AST SERPL-CCNC: 32 U/L (ref 17–59)
BASOPHILS # BLD AUTO: 0 K/UL (ref 0–0.2)
BASOPHILS NFR BLD AUTO: 1 %
BUN SERPL-SCNC: 19 MG/DL (ref 9–20)
CALCIUM SPEC-MCNC: 9.1 MG/DL (ref 8.4–10.2)
CHLORIDE SERPL-SCNC: 106 MMOL/L (ref 98–107)
CO2 SERPL-SCNC: 25 MMOL/L (ref 22–30)
EOSINOPHIL # BLD AUTO: 0.2 K/UL (ref 0–0.7)
EOSINOPHIL NFR BLD AUTO: 4 %
ERYTHROCYTE [DISTWIDTH] IN BLOOD BY AUTOMATED COUNT: 4.27 M/UL (ref 4.3–5.9)
ERYTHROCYTE [DISTWIDTH] IN BLOOD: 13 % (ref 11.5–15.5)
GLUCOSE SERPL-MCNC: 113 MG/DL (ref 74–99)
HCT VFR BLD AUTO: 39.2 % (ref 39–53)
HGB BLD-MCNC: 13.7 GM/DL (ref 13–17.5)
LIPASE SERPL-CCNC: 21 U/L (ref 23–300)
LYMPHOCYTES # SPEC AUTO: 1.9 K/UL (ref 1–4.8)
LYMPHOCYTES NFR SPEC AUTO: 37 %
MCH RBC QN AUTO: 32 PG (ref 25–35)
MCHC RBC AUTO-ENTMCNC: 34.9 G/DL (ref 31–37)
MCV RBC AUTO: 91.8 FL (ref 80–100)
MONOCYTES # BLD AUTO: 0.4 K/UL (ref 0–1)
MONOCYTES NFR BLD AUTO: 7 %
NEUTROPHILS # BLD AUTO: 2.6 K/UL (ref 1.3–7.7)
NEUTROPHILS NFR BLD AUTO: 49 %
PH UR: 5.5 [PH] (ref 5–8)
PLATELET # BLD AUTO: 157 K/UL (ref 150–450)
POTASSIUM SERPL-SCNC: 4 MMOL/L (ref 3.5–5.1)
PROT SERPL-MCNC: 6.5 G/DL (ref 6.3–8.2)
SODIUM SERPL-SCNC: 141 MMOL/L (ref 137–145)
SP GR UR: 1.02 (ref 1–1.03)
UROBILINOGEN UR QL STRIP: 3 MG/DL (ref ?–2)
WBC # BLD AUTO: 5.3 K/UL (ref 3.8–10.6)

## 2018-07-21 PROCEDURE — 83690 ASSAY OF LIPASE: CPT

## 2018-07-21 PROCEDURE — 85025 COMPLETE CBC W/AUTO DIFF WBC: CPT

## 2018-07-21 PROCEDURE — 83605 ASSAY OF LACTIC ACID: CPT

## 2018-07-21 PROCEDURE — 96374 THER/PROPH/DIAG INJ IV PUSH: CPT

## 2018-07-21 PROCEDURE — 80053 COMPREHEN METABOLIC PANEL: CPT

## 2018-07-21 PROCEDURE — 99284 EMERGENCY DEPT VISIT MOD MDM: CPT

## 2018-07-21 PROCEDURE — 74177 CT ABD & PELVIS W/CONTRAST: CPT

## 2018-07-21 PROCEDURE — 96361 HYDRATE IV INFUSION ADD-ON: CPT

## 2018-07-21 PROCEDURE — 36415 COLL VENOUS BLD VENIPUNCTURE: CPT

## 2018-07-21 PROCEDURE — 81003 URINALYSIS AUTO W/O SCOPE: CPT

## 2018-07-21 PROCEDURE — 82150 ASSAY OF AMYLASE: CPT

## 2018-07-21 PROCEDURE — 87086 URINE CULTURE/COLONY COUNT: CPT
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
## 2018-07-21 NOTE — CT
EXAMINATION TYPE: CT abdomen pelvis w con

 

DATE OF EXAM: 7/21/2018

 

COMPARISON: Prior CT study dated 6/13/2017.

 

HISTORY: Patient complains of hernia and abnormal xray at  office.

 

CT DLP: 1146.8 mGycm

Automated exposure control for dose reduction was used.

 

TECHNIQUE:  Helical acquisition of images was performed from the lung bases through the pelvis.

 

CONTRAST: 

Performed without Oral Contrast and with IV Contrast, patient injected with 100 mL of Isovue 300.

 

FINDINGS: 

 

LUNG BASES: No significant abnormality is appreciated.

 

LIVER/GB: Post cholecystectomy changes. 

 

PANCREAS: No significant abnormality is seen.

 

SPLEEN: No significant abnormality is seen.

 

ADRENALS: No significant abnormality is seen.

 

KIDNEYS: No significant abnormality is seen.

 

 

FREE AIR:  No free air is visualized.

 

RETROPERITONEAL ADENOPATHY:  None visualized

 

REPRODUCTIVE ORGANS: No significant abnormality is seen

 

URINARY BLADDER:  No significant abnormality is seen.

 

PELVIC ADENOPATHY:  None visualized.

 

OSSEOUS STRUCTURES:  No significant abnormality is seen.

 

BOWEL: Distended loops of bowel projecting along the mid abdomen with a hernia containing loops of drew
wel visible on axial image labeled #46. Fluid levels and distended loops of bowel indicate that the h
ernia is producing moderate obstruction.

 

 OTHER: No free intraperitoneal air or significant fluid inside the peritoneal cavity.

 

IMPRESSION: 

ABDOMINAL WALL HERNIA ON THE LEFT SIDE OF THE ABDOMEN WHICH IS VISIBLE ON AXIAL IMAGE LABELED #45-CON
TAINING LOOPS OF BOWEL. DISTINCTION O AND FLUID LEVELS OF THE PROXIMAL LOOPS OF BOWEL INDICATING MODE
RATE OBSTRUCTION. NO FREE AIR OR SIGNIFICANT FREE FLUID INSIDE THE PERITONEAL CAVITY.

## 2018-11-23 ENCOUNTER — HOSPITAL ENCOUNTER (OUTPATIENT)
Dept: HOSPITAL 47 - LABPAT | Age: 58
Discharge: HOME | End: 2018-11-23
Attending: SURGERY
Payer: COMMERCIAL

## 2018-11-23 DIAGNOSIS — F17.210: ICD-10-CM

## 2018-11-23 DIAGNOSIS — Z01.812: ICD-10-CM

## 2018-11-23 DIAGNOSIS — K43.0: ICD-10-CM

## 2018-11-23 DIAGNOSIS — Z01.818: Primary | ICD-10-CM

## 2018-11-23 LAB
BASOPHILS # BLD AUTO: 0 K/UL (ref 0–0.2)
BASOPHILS NFR BLD AUTO: 1 %
EOSINOPHIL # BLD AUTO: 0.2 K/UL (ref 0–0.7)
EOSINOPHIL NFR BLD AUTO: 4 %
ERYTHROCYTE [DISTWIDTH] IN BLOOD BY AUTOMATED COUNT: 4.4 M/UL (ref 4.3–5.9)
ERYTHROCYTE [DISTWIDTH] IN BLOOD: 13.2 % (ref 11.5–15.5)
HCT VFR BLD AUTO: 42.9 % (ref 39–53)
HGB BLD-MCNC: 14 GM/DL (ref 13–17.5)
LYMPHOCYTES # SPEC AUTO: 2 K/UL (ref 1–4.8)
LYMPHOCYTES NFR SPEC AUTO: 38 %
MCH RBC QN AUTO: 31.9 PG (ref 25–35)
MCHC RBC AUTO-ENTMCNC: 32.7 G/DL (ref 31–37)
MCV RBC AUTO: 97.5 FL (ref 80–100)
MONOCYTES # BLD AUTO: 0.4 K/UL (ref 0–1)
MONOCYTES NFR BLD AUTO: 7 %
NEUTROPHILS # BLD AUTO: 2.6 K/UL (ref 1.3–7.7)
NEUTROPHILS NFR BLD AUTO: 49 %
PLATELET # BLD AUTO: 192 K/UL (ref 150–450)
WBC # BLD AUTO: 5.3 K/UL (ref 3.8–10.6)

## 2018-11-23 PROCEDURE — 93005 ELECTROCARDIOGRAM TRACING: CPT

## 2018-11-23 PROCEDURE — 36415 COLL VENOUS BLD VENIPUNCTURE: CPT

## 2018-11-23 PROCEDURE — 85025 COMPLETE CBC W/AUTO DIFF WBC: CPT

## 2018-11-27 ENCOUNTER — HOSPITAL ENCOUNTER (OUTPATIENT)
Dept: HOSPITAL 47 - OR | Age: 58
Setting detail: OBSERVATION
LOS: 1 days | Discharge: HOME | End: 2018-11-28
Attending: SURGERY | Admitting: SURGERY
Payer: COMMERCIAL

## 2018-11-27 VITALS — BODY MASS INDEX: 28.8 KG/M2

## 2018-11-27 DIAGNOSIS — Z53.31: ICD-10-CM

## 2018-11-27 DIAGNOSIS — G89.4: ICD-10-CM

## 2018-11-27 DIAGNOSIS — M96.1: ICD-10-CM

## 2018-11-27 DIAGNOSIS — M19.90: ICD-10-CM

## 2018-11-27 DIAGNOSIS — F41.9: ICD-10-CM

## 2018-11-27 DIAGNOSIS — K21.9: ICD-10-CM

## 2018-11-27 DIAGNOSIS — F43.10: ICD-10-CM

## 2018-11-27 DIAGNOSIS — Z79.899: ICD-10-CM

## 2018-11-27 DIAGNOSIS — M54.5: ICD-10-CM

## 2018-11-27 DIAGNOSIS — L28.0: ICD-10-CM

## 2018-11-27 DIAGNOSIS — K42.9: ICD-10-CM

## 2018-11-27 DIAGNOSIS — Z87.442: ICD-10-CM

## 2018-11-27 DIAGNOSIS — R53.81: ICD-10-CM

## 2018-11-27 DIAGNOSIS — F17.290: ICD-10-CM

## 2018-11-27 DIAGNOSIS — K43.2: Primary | ICD-10-CM

## 2018-11-27 DIAGNOSIS — Z90.49: ICD-10-CM

## 2018-11-27 DIAGNOSIS — Z79.891: ICD-10-CM

## 2018-11-27 DIAGNOSIS — Z79.1: ICD-10-CM

## 2018-11-27 DIAGNOSIS — K66.0: ICD-10-CM

## 2018-11-27 DIAGNOSIS — F32.9: ICD-10-CM

## 2018-11-27 PROCEDURE — 86900 BLOOD TYPING SEROLOGIC ABO: CPT

## 2018-11-27 PROCEDURE — 0DNW0ZZ RELEASE PERITONEUM, OPEN APPROACH: ICD-10-PCS

## 2018-11-27 PROCEDURE — 85025 COMPLETE CBC W/AUTO DIFF WBC: CPT

## 2018-11-27 PROCEDURE — 80048 BASIC METABOLIC PNL TOTAL CA: CPT

## 2018-11-27 PROCEDURE — 49560: CPT

## 2018-11-27 PROCEDURE — 97162 PT EVAL MOD COMPLEX 30 MIN: CPT

## 2018-11-27 PROCEDURE — 49568: CPT

## 2018-11-27 PROCEDURE — 86850 RBC ANTIBODY SCREEN: CPT

## 2018-11-27 PROCEDURE — 0WQF0ZZ REPAIR ABDOMINAL WALL, OPEN APPROACH: ICD-10-PCS

## 2018-11-27 PROCEDURE — 86901 BLOOD TYPING SEROLOGIC RH(D): CPT

## 2018-11-27 PROCEDURE — 0WUF0JZ SUPPLEMENT ABDOMINAL WALL WITH SYNTHETIC SUBSTITUTE, OPEN APPROACH: ICD-10-PCS

## 2018-11-27 RX ADMIN — DOCUSATE SODIUM SCH MG: 100 CAPSULE, LIQUID FILLED ORAL at 21:11

## 2018-11-27 RX ADMIN — MEPERIDINE HYDROCHLORIDE ONE MG: 50 INJECTION, SOLUTION INTRAMUSCULAR; INTRAVENOUS; SUBCUTANEOUS at 09:51

## 2018-11-27 RX ADMIN — MORPHINE SULFATE SCH MG: 60 TABLET, EXTENDED RELEASE ORAL at 21:06

## 2018-11-27 RX ADMIN — HYDROMORPHONE HYDROCHLORIDE ONE MG: 1 INJECTION, SOLUTION INTRAMUSCULAR; INTRAVENOUS; SUBCUTANEOUS at 10:41

## 2018-11-27 RX ADMIN — MEPERIDINE HYDROCHLORIDE ONE MG: 50 INJECTION, SOLUTION INTRAMUSCULAR; INTRAVENOUS; SUBCUTANEOUS at 10:06

## 2018-11-27 RX ADMIN — MORPHINE SULFATE PRN MG: 2 INJECTION, SOLUTION INTRAMUSCULAR; INTRAVENOUS at 09:40

## 2018-11-27 RX ADMIN — HYDROMORPHONE HYDROCHLORIDE PRN MG: 1 INJECTION, SOLUTION INTRAMUSCULAR; INTRAVENOUS; SUBCUTANEOUS at 11:46

## 2018-11-27 RX ADMIN — HYDROMORPHONE HYDROCHLORIDE PRN MG: 1 INJECTION, SOLUTION INTRAMUSCULAR; INTRAVENOUS; SUBCUTANEOUS at 14:59

## 2018-11-27 RX ADMIN — MORPHINE SULFATE PRN MG: 2 INJECTION, SOLUTION INTRAMUSCULAR; INTRAVENOUS at 09:55

## 2018-11-27 RX ADMIN — POTASSIUM CHLORIDE ONE MLS/HR: 14.9 INJECTION, SOLUTION INTRAVENOUS at 11:22

## 2018-11-27 RX ADMIN — MORPHINE SULFATE SCH: 60 TABLET, EXTENDED RELEASE ORAL at 12:47

## 2018-11-27 RX ADMIN — HYDROMORPHONE HYDROCHLORIDE ONE MG: 1 INJECTION, SOLUTION INTRAMUSCULAR; INTRAVENOUS; SUBCUTANEOUS at 10:32

## 2018-11-27 RX ADMIN — OXYCODONE HYDROCHLORIDE PRN MG: 20 TABLET, FILM COATED, EXTENDED RELEASE ORAL at 17:18

## 2018-11-27 RX ADMIN — MORPHINE SULFATE PRN MG: 2 INJECTION, SOLUTION INTRAMUSCULAR; INTRAVENOUS at 09:45

## 2018-11-27 RX ADMIN — POTASSIUM CHLORIDE ONE MLS/HR: 14.9 INJECTION, SOLUTION INTRAVENOUS at 15:37

## 2018-11-27 RX ADMIN — HYDROCODONE BITARTRATE AND ACETAMINOPHEN PRN EACH: 5; 325 TABLET ORAL at 12:49

## 2018-11-27 RX ADMIN — POTASSIUM CHLORIDE SCH MLS: 14.9 INJECTION, SOLUTION INTRAVENOUS at 06:55

## 2018-11-27 RX ADMIN — MORPHINE SULFATE PRN MG: 2 INJECTION, SOLUTION INTRAMUSCULAR; INTRAVENOUS at 09:50

## 2018-11-27 RX ADMIN — MORPHINE SULFATE PRN MG: 2 INJECTION, SOLUTION INTRAMUSCULAR; INTRAVENOUS at 10:04

## 2018-11-27 NOTE — P.OP
Date of Procedure: 11/27/18


Preoperative Diagnosis: 


Incisional hernia


Postoperative Diagnosis: 


Extensive adhesions





Incisional hernia





Umbilical hernia


Procedure(s) Performed: 


Diagnostic laparoscopy





Lysis of extensive adhesions





Repair of incisional hernia with mesh





Repair of umbilical hernia


Anesthesia: DEBBIE


Surgeon: Ishan Bang


Estimated Blood Loss (ml): 20


Pathology: none sent


Condition: stable


Disposition: PACU


Description of Procedure: 


The patient's placed on the operating table in the supine position.  He 

received general anesthesia.  His abdomen was prepped and draped in the usual 

sterile fashion.  A 5 mm skin incision was made in the left upper quadrant.  

Using a optical trocar under direct visualization the perineal cavity was 

entered.  The abdomen was insufflated.  There were dense adhesions preventing 

insufflation the abdomen.  At this point a another 5 mm trochars placed in the 

left lateral position and the pleural cavity is entered.  The abdomen was 

insufflated.  The laparoscope was placed into the pleural cavity.  There were 

extensive adhesions noted throughout the perineal cavity.  At this point it was 

decided to convert the procedure to an open procedure.  Trochars withdrawn.  

The skin was incised through the previously midline scar.  Using cautery the 

abdominal wall was dissected.  The fascia was opened and approximately 20 

minutes of operative time used to lyse extensive adhesions.  The patient was 

also found have an umbilical hernia.  The incisional hernia was found.  And 

then the incisional hernia was closed with 0 Ethibond suture.  The ventral 

light ST mesh was then secured with a secure strap tacker.  The fascia and 

umbilical hernia was closed using looped #1 PDS suture.  Skin was closed 

staples.  Patient was sent to recovery room stable condition.
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
## 2018-11-27 NOTE — CONS
CONSULTATION



REASON FOR CONSULTATION:

Advice regarding DJD and anxiety and medications, requested by Dr. Bang.



HISTORY OF PRESENT ILLNESS:

This 58-year-old gentleman with a past medical history of DJD, history of back pain,

failed back syndrome, anxiety, depression, PTSD, being followed by Natalie Hills in

the outpatient setting, underwent a diagnostic laparoscopy and lysis of extensive

adhesions, repair of incisional hernia with mesh and repair of umbilical hernia by Dr. Bang.  Patient complains of severe pain at this time.  There is no history of any

fever, rigor or chills. No history of headache, loss of consciousness. No chest pain,

palpitations.



PAST MEDICAL HISTORY:

1. History of DJD.

2. History of chronic back pain.

3. History of anxiety.

4. Depression.

5. PTSD.



MEDICATIONS:

1. OxyContin 20 mg b.i.d. p.r.n.

2. Prilosec 20 mg b.i.d.

3. MS Contin 60 mg p.o. b.i.d.

4. Ibuprofen 600 mg p.o. t.i.d.

5. Valium 10 mg b.i.d. p.r.n.



ALLERGIES:

NONE.



FAMILY HISTORY:

No history of heart disease or strokes in the family.



SOCIAL HISTORY:

History of smoking, continuing.  No history of alcohol intake.  No substance abuse.



REVIEW OF SYSTEMS:

ENT: No diminished hearing. No diminished vision.

CARDIOVASCULAR SYSTEM: No angina, palpitations.

RESPIRATORY SYSTEM: No cough, hemoptysis.

GI: As mentioned earlier.

: No dysuria or retention.

NERVOUS SYSTEM: No numbness, weakness.

ALLERGY/IMMUNOLOGY: No asthma, hayfever.

MUSCULOSKELETAL: As mentioned earlier.

HEMATOLOGY/ONCOLOGY: No history of anemia.

ENDOCRINE: No history of diabetes, hypothyroidism.

CONSTITUTIONAL: As mentioned earlier.

DERMATOLOGY: Negative.

RHEUMATOLOGY: Negative.

PSYCHIATRY: As mentioned earlier.



PHYSICAL EXAMINATION:

Patient alert and oriented x3. Pulse is 104, blood pressure 135/77, respiration 20,

temperature 97.3, pulse ox 94% on 2 L.

HEENT: Conjunctivae normal. Oral mucosa moist.

NECK: No jugular venous distention. No carotid bruit. No lymph node enlargement.

CARDIOVASCULAR SYSTEM:  S1, S2 muffled.

RESPIRATORY SYSTEM: Breath sounds diminished at the bases.  A few scattered rhonchi. No

crackles.

ABDOMEN: Soft. Status post surgery.

LEGS: No edema. No swelling.

NERVOUS SYSTEM: No focal deficit.



LABS:

Labs which were done previously show CBC and BMP within normal limits.



ASSESSMENT:

1. Status post diagnostic laparoscopy with lysis of extensive adhesions and repair of

    incisional hernia with mesh and repair of umbilical hernia.

2. Chronic pain syndrome.

3. History of degenerative joint disease.

4. History of failed back syndrome.

5. Appendectomy.

6. History of anxiety, depression, post-traumatic stress disorder.

7. History of nicotine dependence.



RECOMMENDATIONS AND DISCUSSION:

In this 58-year-old gentleman who presented with multiple medical problems, we will

monitor the patient closely, continue the current management, continue symptomatic

treatment.  I recommend resuming the home dose of long-acting pain medications and

continue to monitor.  Otherwise, DVT prophylaxis.  Symptomatic treatment.  Incentive

spirometry.  Will follow the patient closely with you.  The patient may be asked to

follow up with a primary physician closely.



Thank you, Dr. Bang, for letting us participate in the care of this patient.





MMNURYL / RUBÉNN: [id number] / Job#: [id number]
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
## 2018-11-28 VITALS — TEMPERATURE: 97.6 F | HEART RATE: 67 BPM | SYSTOLIC BLOOD PRESSURE: 155 MMHG | DIASTOLIC BLOOD PRESSURE: 84 MMHG

## 2018-11-28 VITALS — RESPIRATION RATE: 18 BRPM

## 2018-11-28 LAB
ANION GAP SERPL CALC-SCNC: 7 MMOL/L
BASOPHILS # BLD AUTO: 0 K/UL (ref 0–0.2)
BASOPHILS NFR BLD AUTO: 0 %
BUN SERPL-SCNC: 16 MG/DL (ref 9–20)
CALCIUM SPEC-MCNC: 8.6 MG/DL (ref 8.4–10.2)
CHLORIDE SERPL-SCNC: 102 MMOL/L (ref 98–107)
CO2 SERPL-SCNC: 30 MMOL/L (ref 22–30)
EOSINOPHIL # BLD AUTO: 0 K/UL (ref 0–0.7)
EOSINOPHIL NFR BLD AUTO: 0 %
ERYTHROCYTE [DISTWIDTH] IN BLOOD BY AUTOMATED COUNT: 3.65 M/UL (ref 4.3–5.9)
ERYTHROCYTE [DISTWIDTH] IN BLOOD: 13 % (ref 11.5–15.5)
GLUCOSE SERPL-MCNC: 110 MG/DL (ref 74–99)
HCT VFR BLD AUTO: 34.6 % (ref 39–53)
HGB BLD-MCNC: 12.2 GM/DL (ref 13–17.5)
LYMPHOCYTES # SPEC AUTO: 1.5 K/UL (ref 1–4.8)
LYMPHOCYTES NFR SPEC AUTO: 20 %
MCH RBC QN AUTO: 33.3 PG (ref 25–35)
MCHC RBC AUTO-ENTMCNC: 35.1 G/DL (ref 31–37)
MCV RBC AUTO: 94.8 FL (ref 80–100)
MONOCYTES # BLD AUTO: 0.4 K/UL (ref 0–1)
MONOCYTES NFR BLD AUTO: 6 %
NEUTROPHILS # BLD AUTO: 5.1 K/UL (ref 1.3–7.7)
NEUTROPHILS NFR BLD AUTO: 72 %
PLATELET # BLD AUTO: 141 K/UL (ref 150–450)
POTASSIUM SERPL-SCNC: 4.1 MMOL/L (ref 3.5–5.1)
SODIUM SERPL-SCNC: 139 MMOL/L (ref 137–145)
WBC # BLD AUTO: 7.2 K/UL (ref 3.8–10.6)

## 2018-11-28 RX ADMIN — MORPHINE SULFATE SCH MG: 60 TABLET, EXTENDED RELEASE ORAL at 09:26

## 2018-11-28 RX ADMIN — OXYCODONE HYDROCHLORIDE PRN MG: 20 TABLET, FILM COATED, EXTENDED RELEASE ORAL at 09:38

## 2018-11-28 RX ADMIN — POTASSIUM CHLORIDE SCH: 14.9 INJECTION, SOLUTION INTRAVENOUS at 03:39

## 2018-11-28 RX ADMIN — DOCUSATE SODIUM SCH MG: 100 CAPSULE, LIQUID FILLED ORAL at 07:39

## 2018-11-28 RX ADMIN — HYDROCODONE BITARTRATE AND ACETAMINOPHEN PRN EACH: 5; 325 TABLET ORAL at 07:39

## 2018-11-28 RX ADMIN — HYDROMORPHONE HYDROCHLORIDE PRN MG: 1 INJECTION, SOLUTION INTRAMUSCULAR; INTRAVENOUS; SUBCUTANEOUS at 01:37

## 2018-11-28 NOTE — P.DS
Providers


Date of admission: 


11/27/18 09:30





Expected date of discharge: 11/28/18


Attending physician: 


Ishan Bang





Consults: 





 





11/27/18 09:30


Consult Physician Routine 


   Consulting Provider: Chato Jama


   Consult Reason/Comments: Medical management


   Do you want consulting provider notified?: Yes











Primary care physician: 


Li Hills





Hospital Course: 





On the day of admission underwent an elective  laparoscopic robotic system 

repair of incisional hernia.  Patient had developed a 5 cm incisional hernia 

located on the left side of the midline abdominal scar 27th of November 

underwent repair of incisional hernia with mesh diagnostic laparoscopic and 

license of extensive adhesions.  Postop no events.





Patient was seen by Dr. maddox who is the patient's pain management physician.  

Patient has chronic pain that is managed by Dr. maddox .  Patient was to keep 

scheduled appointment on Thursday at 10 AM with Dr. Maddox.  Patient has chronic 

pain management and should resume pain medication upon discharge as ordered by 

Dr. Maddox





Physical occupational therapy eval no home needs identified patient lives with 

daughter who does provide assistance as needed








Impression discharge diagnoses


Medically  debilitated


Chronic back pain


Present on admission 5 cm incisional hernia located left side of midline scar


Status post diagnostic laparoscopy with lysis of adhesions extensive repair of 

umbilical hernia repair of incisional hernia with mesh 








The above impression and plan of care have been discussed and directed by 

signing physician. Naomi Velasco nurse practitioner acting as scribe for signing 

physician.





Plan - Discharge Summary


New Discharge Prescriptions: 


Continue


   Omeprazole [PriLOSEC] 20 mg PO BID


   Diazepam [Valium] 10 mg PO BID PRN


     PRN Reason: Anxiety


   Ibuprofen 600 mg PO TID


   Morphine Sulfate ER [Ms Contin] 60 mg PO BID


   oxyCODONE ER [OxyCONTIN] 20 mg PO Q12HR PRN


     PRN Reason: Breakthrough Pain


Discharge Medication List





Diazepam [Valium] 10 mg PO BID PRN 06/18/14 [History]


Omeprazole [PriLOSEC] 20 mg PO BID 06/18/14 [History]


Ibuprofen 600 mg PO TID 04/20/15 [History]


Morphine Sulfate ER [Ms Contin] 60 mg PO BID 07/21/18 [History]


oxyCODONE ER [OxyCONTIN] 20 mg PO Q12HR PRN 11/20/18 [History]








Follow up Appointment(s)/Referral(s): 


Ishan Bang MD [STAFF PHYSICIAN] - 12/04/18 2:20 pm


Ren Maddox MD [STAFF PHYSICIAN] - 11/29/18


Activity/Diet/Wound Care/Special Instructions: 


No tub bath for six weeks.


Shower daily. 


No lifting over 10 pounds for the next 2 weeks.


To not remove surgical dressing until seen in the follow-up surgical visit or 

dressings fall off


May use ice packs to surgical site.


No driving while taking narcotic for pain.





Discharge Disposition: HOME SELF-CARE

## 2018-11-29 ENCOUNTER — HOSPITAL ENCOUNTER (OUTPATIENT)
Dept: HOSPITAL 47 - EC | Age: 58
Setting detail: OBSERVATION
LOS: 1 days | Discharge: HOME | End: 2018-11-30
Attending: HOSPITALIST | Admitting: HOSPITALIST
Payer: COMMERCIAL

## 2018-11-29 VITALS — BODY MASS INDEX: 28.8 KG/M2

## 2018-11-29 DIAGNOSIS — Z90.49: ICD-10-CM

## 2018-11-29 DIAGNOSIS — Z98.890: ICD-10-CM

## 2018-11-29 DIAGNOSIS — F11.20: ICD-10-CM

## 2018-11-29 DIAGNOSIS — F32.9: ICD-10-CM

## 2018-11-29 DIAGNOSIS — Z79.899: ICD-10-CM

## 2018-11-29 DIAGNOSIS — Z87.442: ICD-10-CM

## 2018-11-29 DIAGNOSIS — Z79.1: ICD-10-CM

## 2018-11-29 DIAGNOSIS — R53.81: ICD-10-CM

## 2018-11-29 DIAGNOSIS — F17.290: ICD-10-CM

## 2018-11-29 DIAGNOSIS — F43.10: ICD-10-CM

## 2018-11-29 DIAGNOSIS — J98.11: ICD-10-CM

## 2018-11-29 DIAGNOSIS — E66.9: ICD-10-CM

## 2018-11-29 DIAGNOSIS — J20.9: Primary | ICD-10-CM

## 2018-11-29 DIAGNOSIS — G89.4: ICD-10-CM

## 2018-11-29 DIAGNOSIS — F41.9: ICD-10-CM

## 2018-11-29 DIAGNOSIS — M54.5: ICD-10-CM

## 2018-11-29 LAB
ALBUMIN SERPL-MCNC: 4.1 G/DL (ref 3.5–5)
ALP SERPL-CCNC: 66 U/L (ref 38–126)
ALT SERPL-CCNC: 37 U/L (ref 21–72)
AMYLASE SERPL-CCNC: 56 U/L (ref 30–110)
ANION GAP SERPL CALC-SCNC: 9 MMOL/L
AST SERPL-CCNC: 32 U/L (ref 17–59)
BASOPHILS # BLD AUTO: 0 K/UL (ref 0–0.2)
BASOPHILS NFR BLD AUTO: 0 %
BUN SERPL-SCNC: 12 MG/DL (ref 9–20)
CALCIUM SPEC-MCNC: 8.8 MG/DL (ref 8.4–10.2)
CHLORIDE SERPL-SCNC: 99 MMOL/L (ref 98–107)
CO2 SERPL-SCNC: 31 MMOL/L (ref 22–30)
EOSINOPHIL # BLD AUTO: 0.1 K/UL (ref 0–0.7)
EOSINOPHIL NFR BLD AUTO: 2 %
ERYTHROCYTE [DISTWIDTH] IN BLOOD BY AUTOMATED COUNT: 3.98 M/UL (ref 4.3–5.9)
ERYTHROCYTE [DISTWIDTH] IN BLOOD: 13 % (ref 11.5–15.5)
GLUCOSE SERPL-MCNC: 107 MG/DL (ref 74–99)
HCT VFR BLD AUTO: 37.8 % (ref 39–53)
HGB BLD-MCNC: 13 GM/DL (ref 13–17.5)
LIPASE SERPL-CCNC: 15 U/L (ref 23–300)
LYMPHOCYTES # SPEC AUTO: 1.2 K/UL (ref 1–4.8)
LYMPHOCYTES NFR SPEC AUTO: 17 %
MCH RBC QN AUTO: 32.6 PG (ref 25–35)
MCHC RBC AUTO-ENTMCNC: 34.3 G/DL (ref 31–37)
MCV RBC AUTO: 94.9 FL (ref 80–100)
MONOCYTES # BLD AUTO: 0.5 K/UL (ref 0–1)
MONOCYTES NFR BLD AUTO: 7 %
NEUTROPHILS # BLD AUTO: 5.2 K/UL (ref 1.3–7.7)
NEUTROPHILS NFR BLD AUTO: 71 %
PLATELET # BLD AUTO: 136 K/UL (ref 150–450)
POTASSIUM SERPL-SCNC: 3.5 MMOL/L (ref 3.5–5.1)
PROT SERPL-MCNC: 6.9 G/DL (ref 6.3–8.2)
SODIUM SERPL-SCNC: 139 MMOL/L (ref 137–145)
WBC # BLD AUTO: 7.3 K/UL (ref 3.8–10.6)

## 2018-11-29 PROCEDURE — 96365 THER/PROPH/DIAG IV INF INIT: CPT

## 2018-11-29 PROCEDURE — 71046 X-RAY EXAM CHEST 2 VIEWS: CPT

## 2018-11-29 PROCEDURE — 36415 COLL VENOUS BLD VENIPUNCTURE: CPT

## 2018-11-29 PROCEDURE — 96367 TX/PROPH/DG ADDL SEQ IV INF: CPT

## 2018-11-29 PROCEDURE — 80053 COMPREHEN METABOLIC PANEL: CPT

## 2018-11-29 PROCEDURE — 87040 BLOOD CULTURE FOR BACTERIA: CPT

## 2018-11-29 PROCEDURE — 99285 EMERGENCY DEPT VISIT HI MDM: CPT

## 2018-11-29 PROCEDURE — 74177 CT ABD & PELVIS W/CONTRAST: CPT

## 2018-11-29 PROCEDURE — 83690 ASSAY OF LIPASE: CPT

## 2018-11-29 PROCEDURE — 96366 THER/PROPH/DIAG IV INF ADDON: CPT

## 2018-11-29 PROCEDURE — 96375 TX/PRO/DX INJ NEW DRUG ADDON: CPT

## 2018-11-29 PROCEDURE — 85025 COMPLETE CBC W/AUTO DIFF WBC: CPT

## 2018-11-29 PROCEDURE — 82150 ASSAY OF AMYLASE: CPT

## 2018-11-29 PROCEDURE — 83605 ASSAY OF LACTIC ACID: CPT

## 2018-11-29 RX ADMIN — NICARDIPINE HYDROCHLORIDE SCH MLS/HR: 2.5 INJECTION INTRAVENOUS at 23:56

## 2018-11-29 RX ADMIN — NICARDIPINE HYDROCHLORIDE SCH MLS/HR: 2.5 INJECTION INTRAVENOUS at 23:57

## 2018-11-29 NOTE — XR
EXAMINATION TYPE: XR chest 2V

 

DATE OF EXAM: 11/29/2018

 

COMPARISON: Today

 

HISTORY: Fever

 

TECHNIQUE:  Frontal and lateral views of the chest are obtained.

 

FINDINGS:  There is some patchy infiltrate and atelectasis at the right lung base. The other lung fie
lds are fairly clear. There is no heart failure. Heart size is normal. There is no pleural effusion. 
Bony thorax is intact.

 

IMPRESSION:  There is increasing right lower lobe infiltrate and atelectasis compared to exam earlier
 today.
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
## 2018-11-30 VITALS — SYSTOLIC BLOOD PRESSURE: 134 MMHG | TEMPERATURE: 97.7 F | DIASTOLIC BLOOD PRESSURE: 83 MMHG | HEART RATE: 69 BPM

## 2018-11-30 VITALS — RESPIRATION RATE: 18 BRPM

## 2018-11-30 RX ADMIN — OXYCODONE HYDROCHLORIDE PRN MG: 20 TABLET, FILM COATED, EXTENDED RELEASE ORAL at 08:02

## 2018-11-30 RX ADMIN — PIPERACILLIN AND TAZOBACTAM SCH MLS/HR: 3; .375 INJECTION, POWDER, FOR SOLUTION INTRAVENOUS at 11:14

## 2018-11-30 RX ADMIN — CEFAZOLIN SCH MLS/HR: 330 INJECTION, POWDER, FOR SOLUTION INTRAMUSCULAR; INTRAVENOUS at 11:13

## 2018-11-30 RX ADMIN — CEFAZOLIN SCH MLS/HR: 330 INJECTION, POWDER, FOR SOLUTION INTRAMUSCULAR; INTRAVENOUS at 02:08

## 2018-11-30 RX ADMIN — PIPERACILLIN AND TAZOBACTAM SCH MLS/HR: 3; .375 INJECTION, POWDER, FOR SOLUTION INTRAVENOUS at 08:02

## 2018-11-30 RX ADMIN — PIPERACILLIN AND TAZOBACTAM SCH: 3; .375 INJECTION, POWDER, FOR SOLUTION INTRAVENOUS at 02:12

## 2018-11-30 RX ADMIN — OXYCODONE HYDROCHLORIDE PRN MG: 20 TABLET, FILM COATED, EXTENDED RELEASE ORAL at 04:48
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
## 2018-11-30 NOTE — CT
EXAMINATION TYPE: CT abdomen pelvis w con

 

DATE OF EXAM: 11/30/2018

 

COMPARISON: 7/21/2018

 

HISTORY: abdominal pain

 

CT DLP: 1146.6 mGycm

Automated exposure control for dose reduction was used.

 

TECHNIQUE:  Helical acquisition of images was performed from the lung bases through the pelvis.

 

CONTRAST: 

Performed without Oral Contrast and with IV Contrast, patient injected with 100mL mL of Isovue 300.

 

FINDINGS: 

 

There is some patchy atelectasis at the lung bases and more on the right side. There is no pleural ef
fusion. There is probably some fatty infiltration of the liver. There is no pericardial effusion. Spl
een appears normal. There is no evidence of a pancreatic mass. There are clips from cholecystectomy. 
Bile ducts are not dilated.

 

There is no adrenal mass. Kidneys show satisfactory contrast opacification. There is no hydronephrosi
s. Ureters are not dilated. There is no retroperitoneal adenopathy. Bladder distends smoothly. There 
is no inguinal hernia. There is no free fluid in the pelvis. There is midline incision with skin stap
les in the anterior abdomen. There is 4 x 2 cm area of increased density in the subcutaneous fat over
 the left anterior mid abdomen with small air bubble. Is not clear if this is a persistent ventral he
rnia with incarceration that is evident on the old CT scan. This could be surgical. There is mild sub
cutaneous edema over the lower abdomen posteriorly. I see no mesenteric edema. There is no mesenteric
 adenopathy. There is no sign of appendicitis. There is a dilated fluid-filled loop of small bowel in
 the mid abdomen. This measures up to 4 cm. Proximal small bowel is not dilated.

 

The lumbar vertebra have normal alignment. There is degenerative disc space narrowing throughout the 
lumbar spine with spur formation. The facet joints are intact. I see no compression fracture. The bon
y pelvis is intact.

IMPRESSION: 

THERE IS SUBCUTANEOUS DENSITY TO THE LEFT OF MIDLINE ON THE LEFT ANTERIOR ABDOMEN THAT IS PROBABLY A 
PERSISTENT INCARCERATED SMALL BOWEL HERNIA. THERE IS LOCALIZED DILATED SMALL BOWEL THAT COULD BE RELA
RODERICK TO A PARTIAL OBSTRUCTION THAT IS ALSO EVIDENT ON THE OLD CT SCAN.

 

THERE ARE SKIN STAPLES IN THE MIDLINE APPARENTLY RELATED TO UMBILICAL HERNIA REPAIR. CORRELATION WITH
 THE SURGICAL HISTORY IS NEEDED.

 

THERE IS NEW PATCHY ATELECTASIS AT THE LUNG BASES COMPARED TO OLD EXAM.

## 2018-11-30 NOTE — P.DS
Providers


Date of admission: 


11/29/18 23:27





Attending physician: 


Chato Jama





Consults: 





 





11/29/18 23:46


Consult Physician Stat 


   Consulting Provider: Ishan Bang


   Consult Reason/Comments: erythema/drainage from surgical incision,


   Do you want consulting provider notified?: Yes











Primary care physician: 


Li Hills





Hospital Course: 


Please refer to my HPI





Patient Condition at Discharge: Good





Plan - Discharge Summary


New Discharge Prescriptions: 


New


   Doxycycline Monohydrate [Monodox] 100 mg PO BID 3 Days #6 cap





No Action


   Omeprazole [PriLOSEC] 20 mg PO DAILY


   Diazepam [Valium] 10 mg PO BID PRN


     PRN Reason: Anxiety


   Ibuprofen 600 mg PO TID


   Morphine Sulfate ER [Ms Contin] 60 mg PO BID


   oxyCODONE ER [OxyCONTIN] 20 mg PO Q12HR PRN


     PRN Reason: Breakthrough Pain


Discharge Medication List





Diazepam [Valium] 10 mg PO BID PRN 06/18/14 [History]


Omeprazole [PriLOSEC] 20 mg PO DAILY 06/18/14 [History]


Ibuprofen 600 mg PO TID 04/20/15 [History]


Morphine Sulfate ER [Ms Contin] 60 mg PO BID 07/21/18 [History]


oxyCODONE ER [OxyCONTIN] 20 mg PO Q12HR PRN 11/20/18 [History]


Doxycycline Monohydrate [Monodox] 100 mg PO BID 3 Days #6 cap 11/30/18 [Rx]








Follow up Appointment(s)/Referral(s): 


Li Hills MD [Primary Care Provider] - 3 Days


Discharge Disposition: HOME SELF-CARE

## 2020-01-11 ENCOUNTER — HOSPITAL ENCOUNTER (EMERGENCY)
Dept: HOSPITAL 47 - EC | Age: 60
Discharge: HOME | End: 2020-01-11
Payer: COMMERCIAL

## 2020-01-11 VITALS
DIASTOLIC BLOOD PRESSURE: 82 MMHG | SYSTOLIC BLOOD PRESSURE: 116 MMHG | TEMPERATURE: 98.2 F | RESPIRATION RATE: 16 BRPM | HEART RATE: 69 BPM

## 2020-01-11 DIAGNOSIS — Z90.49: ICD-10-CM

## 2020-01-11 DIAGNOSIS — G89.29: ICD-10-CM

## 2020-01-11 DIAGNOSIS — Z53.20: ICD-10-CM

## 2020-01-11 DIAGNOSIS — Z79.899: ICD-10-CM

## 2020-01-11 DIAGNOSIS — F17.200: ICD-10-CM

## 2020-01-11 DIAGNOSIS — Z79.891: ICD-10-CM

## 2020-01-11 DIAGNOSIS — N13.2: Primary | ICD-10-CM

## 2020-01-11 DIAGNOSIS — Z79.1: ICD-10-CM

## 2020-01-11 LAB
ALBUMIN SERPL-MCNC: 4.3 G/DL (ref 3.5–5)
ALP SERPL-CCNC: 94 U/L (ref 38–126)
ALT SERPL-CCNC: 32 U/L (ref 4–49)
ANION GAP SERPL CALC-SCNC: 7 MMOL/L
AST SERPL-CCNC: 31 U/L (ref 17–59)
BASOPHILS # BLD AUTO: 0 K/UL (ref 0–0.2)
BASOPHILS NFR BLD AUTO: 1 %
BUN SERPL-SCNC: 18 MG/DL (ref 9–20)
CALCIUM SPEC-MCNC: 9.1 MG/DL (ref 8.4–10.2)
CHLORIDE SERPL-SCNC: 106 MMOL/L (ref 98–107)
CO2 SERPL-SCNC: 28 MMOL/L (ref 22–30)
EOSINOPHIL # BLD AUTO: 0.3 K/UL (ref 0–0.7)
EOSINOPHIL NFR BLD AUTO: 5 %
ERYTHROCYTE [DISTWIDTH] IN BLOOD BY AUTOMATED COUNT: 4.56 M/UL (ref 4.3–5.9)
ERYTHROCYTE [DISTWIDTH] IN BLOOD: 12.5 % (ref 11.5–15.5)
GLUCOSE SERPL-MCNC: 107 MG/DL (ref 74–99)
HCT VFR BLD AUTO: 43.5 % (ref 39–53)
HGB BLD-MCNC: 15.2 GM/DL (ref 13–17.5)
HYALINE CASTS UR QL AUTO: 1 /LPF (ref 0–2)
LYMPHOCYTES # SPEC AUTO: 2.2 K/UL (ref 1–4.8)
LYMPHOCYTES NFR SPEC AUTO: 35 %
MCH RBC QN AUTO: 33.2 PG (ref 25–35)
MCHC RBC AUTO-ENTMCNC: 34.8 G/DL (ref 31–37)
MCV RBC AUTO: 95.4 FL (ref 80–100)
MONOCYTES # BLD AUTO: 0.5 K/UL (ref 0–1)
MONOCYTES NFR BLD AUTO: 8 %
NEUTROPHILS # BLD AUTO: 3.2 K/UL (ref 1.3–7.7)
NEUTROPHILS NFR BLD AUTO: 49 %
PH UR: 5 [PH] (ref 5–8)
PLATELET # BLD AUTO: 173 K/UL (ref 150–450)
POTASSIUM SERPL-SCNC: 4.1 MMOL/L (ref 3.5–5.1)
PROT SERPL-MCNC: 7.2 G/DL (ref 6.3–8.2)
RBC UR QL: 2 /HPF (ref 0–5)
SODIUM SERPL-SCNC: 141 MMOL/L (ref 137–145)
SP GR UR: 1.02 (ref 1–1.03)
UROBILINOGEN UR QL STRIP: <2 MG/DL (ref ?–2)
WBC # BLD AUTO: 6.5 K/UL (ref 3.8–10.6)
WBC #/AREA URNS HPF: <1 /HPF (ref 0–5)

## 2020-01-11 PROCEDURE — 81001 URINALYSIS AUTO W/SCOPE: CPT

## 2020-01-11 PROCEDURE — 74176 CT ABD & PELVIS W/O CONTRAST: CPT

## 2020-01-11 PROCEDURE — 85025 COMPLETE CBC W/AUTO DIFF WBC: CPT

## 2020-01-11 PROCEDURE — 96361 HYDRATE IV INFUSION ADD-ON: CPT

## 2020-01-11 PROCEDURE — 96374 THER/PROPH/DIAG INJ IV PUSH: CPT

## 2020-01-11 PROCEDURE — 36415 COLL VENOUS BLD VENIPUNCTURE: CPT

## 2020-01-11 PROCEDURE — 96375 TX/PRO/DX INJ NEW DRUG ADDON: CPT

## 2020-01-11 PROCEDURE — 99284 EMERGENCY DEPT VISIT MOD MDM: CPT

## 2020-01-11 PROCEDURE — 74018 RADEX ABDOMEN 1 VIEW: CPT

## 2020-01-11 PROCEDURE — 83605 ASSAY OF LACTIC ACID: CPT

## 2020-01-11 PROCEDURE — 80053 COMPREHEN METABOLIC PANEL: CPT

## 2020-01-11 PROCEDURE — 96376 TX/PRO/DX INJ SAME DRUG ADON: CPT

## 2020-01-11 NOTE — XR
EXAMINATION TYPE: XR KUB , 2 VIEWS

 

DATE OF EXAM ORDERED: 1/11/2020

 

HISTORY: Right flank pain.

 

COMPARISON: Previous study dated 6/13/2017.

 

FINDINGS:  Lung bases are clear.

 

Within the abdomen, the gallbladder is been removed. The abdominal gas pattern is within normal limit
s. There is no evidence of obstruction or free air. No unusual calcifications are seen. There are mod
erate degenerative changes within the spine.

 

IMPRESSION: 

 

NO ACUTE INTRA-ABDOMINAL ABNORMALITY.
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
## 2020-02-08 ENCOUNTER — HOSPITAL ENCOUNTER (EMERGENCY)
Dept: HOSPITAL 47 - EC | Age: 60
Discharge: HOME | End: 2020-02-08
Payer: COMMERCIAL

## 2020-02-08 VITALS — DIASTOLIC BLOOD PRESSURE: 86 MMHG | HEART RATE: 74 BPM | TEMPERATURE: 98 F | SYSTOLIC BLOOD PRESSURE: 129 MMHG

## 2020-02-08 VITALS — RESPIRATION RATE: 18 BRPM

## 2020-02-08 VITALS
RESPIRATION RATE: 20 BRPM | HEART RATE: 90 BPM | SYSTOLIC BLOOD PRESSURE: 162 MMHG | TEMPERATURE: 97.8 F | DIASTOLIC BLOOD PRESSURE: 86 MMHG

## 2020-02-08 DIAGNOSIS — F17.200: ICD-10-CM

## 2020-02-08 DIAGNOSIS — N13.2: ICD-10-CM

## 2020-02-08 DIAGNOSIS — Z79.899: ICD-10-CM

## 2020-02-08 DIAGNOSIS — N20.0: Primary | ICD-10-CM

## 2020-02-08 DIAGNOSIS — Z90.49: ICD-10-CM

## 2020-02-08 DIAGNOSIS — Z90.89: ICD-10-CM

## 2020-02-08 DIAGNOSIS — R10.9: Primary | ICD-10-CM

## 2020-02-08 LAB
ALBUMIN SERPL-MCNC: 4.1 G/DL (ref 3.5–5)
ALP SERPL-CCNC: 87 U/L (ref 38–126)
ALT SERPL-CCNC: 37 U/L (ref 4–49)
ANION GAP SERPL CALC-SCNC: 5 MMOL/L
AST SERPL-CCNC: 33 U/L (ref 17–59)
BASOPHILS # BLD AUTO: 0.1 K/UL (ref 0–0.2)
BASOPHILS NFR BLD AUTO: 1 %
BUN SERPL-SCNC: 18 MG/DL (ref 9–20)
CALCIUM SPEC-MCNC: 8.7 MG/DL (ref 8.4–10.2)
CHLORIDE SERPL-SCNC: 103 MMOL/L (ref 98–107)
CO2 SERPL-SCNC: 29 MMOL/L (ref 22–30)
EOSINOPHIL # BLD AUTO: 0.3 K/UL (ref 0–0.7)
EOSINOPHIL NFR BLD AUTO: 5 %
ERYTHROCYTE [DISTWIDTH] IN BLOOD BY AUTOMATED COUNT: 4.45 M/UL (ref 4.3–5.9)
ERYTHROCYTE [DISTWIDTH] IN BLOOD: 12.4 % (ref 11.5–15.5)
GLUCOSE SERPL-MCNC: 117 MG/DL (ref 74–99)
HCT VFR BLD AUTO: 42.3 % (ref 39–53)
HGB BLD-MCNC: 14.4 GM/DL (ref 13–17.5)
LYMPHOCYTES # SPEC AUTO: 2.4 K/UL (ref 1–4.8)
LYMPHOCYTES NFR SPEC AUTO: 35 %
MCH RBC QN AUTO: 32.4 PG (ref 25–35)
MCHC RBC AUTO-ENTMCNC: 34.1 G/DL (ref 31–37)
MCV RBC AUTO: 95.1 FL (ref 80–100)
MONOCYTES # BLD AUTO: 0.5 K/UL (ref 0–1)
MONOCYTES NFR BLD AUTO: 7 %
NEUTROPHILS # BLD AUTO: 3.4 K/UL (ref 1.3–7.7)
NEUTROPHILS NFR BLD AUTO: 49 %
PH UR: 7 [PH] (ref 5–8)
PLATELET # BLD AUTO: 135 K/UL (ref 150–450)
POTASSIUM SERPL-SCNC: 4.1 MMOL/L (ref 3.5–5.1)
PROT SERPL-MCNC: 6.7 G/DL (ref 6.3–8.2)
RBC UR QL: 48 /HPF (ref 0–5)
SODIUM SERPL-SCNC: 137 MMOL/L (ref 137–145)
SP GR UR: 1.02 (ref 1–1.03)
SQUAMOUS UR QL AUTO: <1 /HPF (ref 0–4)
UROBILINOGEN UR QL STRIP: 2 MG/DL (ref ?–2)
WBC # BLD AUTO: 6.9 K/UL (ref 3.8–10.6)
WBC # UR AUTO: 1 /HPF (ref 0–5)

## 2020-02-08 PROCEDURE — 96374 THER/PROPH/DIAG INJ IV PUSH: CPT

## 2020-02-08 PROCEDURE — 81001 URINALYSIS AUTO W/SCOPE: CPT

## 2020-02-08 PROCEDURE — 80053 COMPREHEN METABOLIC PANEL: CPT

## 2020-02-08 PROCEDURE — 99283 EMERGENCY DEPT VISIT LOW MDM: CPT

## 2020-02-08 PROCEDURE — 99284 EMERGENCY DEPT VISIT MOD MDM: CPT

## 2020-02-08 PROCEDURE — 74150 CT ABDOMEN W/O CONTRAST: CPT

## 2020-02-08 PROCEDURE — 96375 TX/PRO/DX INJ NEW DRUG ADDON: CPT

## 2020-02-08 PROCEDURE — 85025 COMPLETE CBC W/AUTO DIFF WBC: CPT

## 2020-02-08 PROCEDURE — 96361 HYDRATE IV INFUSION ADD-ON: CPT

## 2020-02-08 PROCEDURE — 36415 COLL VENOUS BLD VENIPUNCTURE: CPT
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
## 2020-02-08 NOTE — CT
EXAMINATION TYPE: CT renal stones wo con

 

DATE OF EXAM: 2/8/2020

 

COMPARISON: 1/11/2020

 

HISTORY: left flank pain

 

CT DLP: 935.7 mGycm

Automated exposure control for dose reduction was used.

 

Multiple axial sections were obtained from the diaphragm to the floor the pelvis without contrast.

 

Lung bases are clear of consolidation. There is no pleural effusion. Heart size is normal.

 

Liver shows no focal defect. There are clips from cholecystectomy. Spleen is intact. Stomach appears 
normal. There is no pancreatic mass. Bile ducts are not dilated. There is very minimal small bowel me
senteric edema.

 

There is no adrenal mass. Kidneys have normal size and contour. There is mild left-sided hydronephros
is. There is 4 mm calculus at the left ureteropelvic junction. Right kidney shows no sign of obstruct
ion. Ureters are not dilated. Bladder distends smoothly. There is no inguinal hernia. There is no sherrie
e fluid in the pelvis. Appendix is not seen. There is no sign of thickened appendix.

 

There is no evidence of a bowel obstruction. There is small umbilical hernia that contains small tiffany
l. Lumbar vertebra have normal alignment. Disc spaces are fairly normal. There is no compression frac
ture. Bony pelvis is intact.

 

IMPRESSION:

Small obstructing calculus at the left ureteropelvic Junction is a change compared to last exam. Umbi
lical hernia without evidence of a bowel obstruction. Unchanged. There is mild small bowel mesenteric
 edema unchanged.
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
## 2021-04-06 ENCOUNTER — HOSPITAL ENCOUNTER (INPATIENT)
Dept: HOSPITAL 47 - EC | Age: 61
LOS: 4 days | Discharge: HOME | DRG: 177 | End: 2021-04-10
Attending: INTERNAL MEDICINE | Admitting: INTERNAL MEDICINE
Payer: COMMERCIAL

## 2021-04-06 DIAGNOSIS — Z87.39: ICD-10-CM

## 2021-04-06 DIAGNOSIS — J12.82: ICD-10-CM

## 2021-04-06 DIAGNOSIS — G89.29: ICD-10-CM

## 2021-04-06 DIAGNOSIS — Z87.19: ICD-10-CM

## 2021-04-06 DIAGNOSIS — Z87.2: ICD-10-CM

## 2021-04-06 DIAGNOSIS — Z87.828: ICD-10-CM

## 2021-04-06 DIAGNOSIS — M54.5: ICD-10-CM

## 2021-04-06 DIAGNOSIS — J96.01: ICD-10-CM

## 2021-04-06 DIAGNOSIS — E87.6: ICD-10-CM

## 2021-04-06 DIAGNOSIS — D69.6: ICD-10-CM

## 2021-04-06 DIAGNOSIS — Z86.59: ICD-10-CM

## 2021-04-06 DIAGNOSIS — Z87.442: ICD-10-CM

## 2021-04-06 DIAGNOSIS — Z90.49: ICD-10-CM

## 2021-04-06 DIAGNOSIS — Z79.899: ICD-10-CM

## 2021-04-06 DIAGNOSIS — Z79.891: ICD-10-CM

## 2021-04-06 DIAGNOSIS — U07.1: Primary | ICD-10-CM

## 2021-04-06 LAB
ALBUMIN SERPL-MCNC: 3.4 G/DL (ref 3.5–5)
ALP SERPL-CCNC: 72 U/L (ref 38–126)
ALT SERPL-CCNC: 38 U/L (ref 4–49)
ANION GAP SERPL CALC-SCNC: 9 MMOL/L
APTT BLD: 25.6 SEC (ref 22–30)
AST SERPL-CCNC: 57 U/L (ref 17–59)
BASOPHILS # BLD AUTO: 0 K/UL (ref 0–0.2)
BASOPHILS NFR BLD AUTO: 1 %
BUN SERPL-SCNC: 13 MG/DL (ref 9–20)
CALCIUM SPEC-MCNC: 8 MG/DL (ref 8.4–10.2)
CHLORIDE SERPL-SCNC: 97 MMOL/L (ref 98–107)
CO2 SERPL-SCNC: 28 MMOL/L (ref 22–30)
D DIMER PPP FEU-MCNC: 0.79 MG/L FEU (ref ?–0.6)
EOSINOPHIL # BLD AUTO: 0 K/UL (ref 0–0.7)
EOSINOPHIL NFR BLD AUTO: 1 %
ERYTHROCYTE [DISTWIDTH] IN BLOOD BY AUTOMATED COUNT: 4.22 M/UL (ref 4.3–5.9)
ERYTHROCYTE [DISTWIDTH] IN BLOOD: 12.3 % (ref 11.5–15.5)
GLUCOSE SERPL-MCNC: 86 MG/DL (ref 74–99)
HCT VFR BLD AUTO: 38.9 % (ref 39–53)
HGB BLD-MCNC: 13.9 GM/DL (ref 13–17.5)
INR PPP: 1 (ref ?–1.2)
LDH SPEC-CCNC: 1068 U/L (ref 313–618)
LYMPHOCYTES # SPEC AUTO: 1.1 K/UL (ref 1–4.8)
LYMPHOCYTES NFR SPEC AUTO: 23 %
MAGNESIUM SPEC-SCNC: 1.9 MG/DL (ref 1.6–2.3)
MCH RBC QN AUTO: 32.9 PG (ref 25–35)
MCHC RBC AUTO-ENTMCNC: 35.7 G/DL (ref 31–37)
MCV RBC AUTO: 92.4 FL (ref 80–100)
MONOCYTES # BLD AUTO: 0.3 K/UL (ref 0–1)
MONOCYTES NFR BLD AUTO: 6 %
NEUTROPHILS # BLD AUTO: 3.2 K/UL (ref 1.3–7.7)
NEUTROPHILS NFR BLD AUTO: 68 %
PLATELET # BLD AUTO: 134 K/UL (ref 150–450)
POTASSIUM SERPL-SCNC: 3.2 MMOL/L (ref 3.5–5.1)
PROT SERPL-MCNC: 6.1 G/DL (ref 6.3–8.2)
PT BLD: 10.3 SEC (ref 9–12)
SODIUM SERPL-SCNC: 134 MMOL/L (ref 137–145)
WBC # BLD AUTO: 4.7 K/UL (ref 3.8–10.6)

## 2021-04-06 PROCEDURE — 96365 THER/PROPH/DIAG IV INF INIT: CPT

## 2021-04-06 PROCEDURE — 83735 ASSAY OF MAGNESIUM: CPT

## 2021-04-06 PROCEDURE — 87040 BLOOD CULTURE FOR BACTERIA: CPT

## 2021-04-06 PROCEDURE — 83605 ASSAY OF LACTIC ACID: CPT

## 2021-04-06 PROCEDURE — 85379 FIBRIN DEGRADATION QUANT: CPT

## 2021-04-06 PROCEDURE — 80053 COMPREHEN METABOLIC PANEL: CPT

## 2021-04-06 PROCEDURE — 83615 LACTATE (LD) (LDH) ENZYME: CPT

## 2021-04-06 PROCEDURE — 99285 EMERGENCY DEPT VISIT HI MDM: CPT

## 2021-04-06 PROCEDURE — 85610 PROTHROMBIN TIME: CPT

## 2021-04-06 PROCEDURE — 86140 C-REACTIVE PROTEIN: CPT

## 2021-04-06 PROCEDURE — 84145 PROCALCITONIN (PCT): CPT

## 2021-04-06 PROCEDURE — 87635 SARS-COV-2 COVID-19 AMP PRB: CPT

## 2021-04-06 PROCEDURE — 85730 THROMBOPLASTIN TIME PARTIAL: CPT

## 2021-04-06 PROCEDURE — 71046 X-RAY EXAM CHEST 2 VIEWS: CPT

## 2021-04-06 PROCEDURE — 36415 COLL VENOUS BLD VENIPUNCTURE: CPT

## 2021-04-06 PROCEDURE — 94640 AIRWAY INHALATION TREATMENT: CPT

## 2021-04-06 PROCEDURE — 85025 COMPLETE CBC W/AUTO DIFF WBC: CPT

## 2021-04-06 PROCEDURE — 82728 ASSAY OF FERRITIN: CPT

## 2021-04-06 PROCEDURE — 93005 ELECTROCARDIOGRAM TRACING: CPT

## 2021-04-06 RX ADMIN — DEXTROSE SCH MG: 50 INJECTION, SOLUTION INTRAVENOUS at 18:38

## 2021-04-06 NOTE — XR
EXAMINATION TYPE: XR chest 2V

 

DATE OF EXAM: 4/6/2021

 

COMPARISON: 11/19/2018

 

INDICATION: Cough and shortness of breath

 

TECHNIQUE:  Frontal and lateral views of the chest are obtained.

 

FINDINGS:  

The heart size is normal.  

The pulmonary vasculature is somewhat prominent.

There is mild increased patchy infiltrates within the mid and lower lung fields.

 

IMPRESSION:  

1. Mild patchy infiltrate within the mid and lower perihilar regions. Findings are nonspecific. Corre
late for bronchitis or atypical pneumonia.
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
## 2021-04-07 LAB
ALBUMIN SERPL-MCNC: 3.7 G/DL (ref 3.8–4.9)
ALBUMIN/GLOB SERPL: 1.76 G/DL (ref 1.6–3.17)
ALP SERPL-CCNC: 73 U/L (ref 41–126)
ALT SERPL-CCNC: 40 U/L (ref 10–49)
ANION GAP SERPL CALC-SCNC: 7.9 MMOL/L (ref 4–12)
AST SERPL-CCNC: 47 U/L (ref 14–35)
BASOPHILS # BLD AUTO: 0 X 10*3/UL (ref 0–0.1)
BASOPHILS NFR BLD AUTO: 0 %
BUN SERPL-SCNC: 16 MG/DL (ref 9–27)
BUN/CREAT SERPL: 20 RATIO (ref 12–20)
CALCIUM SPEC-MCNC: 8.6 MG/DL (ref 8.7–10.3)
CHLORIDE SERPL-SCNC: 101 MMOL/L (ref 96–109)
CO2 SERPL-SCNC: 31.1 MMOL/L (ref 21.6–31.8)
EOSINOPHIL # BLD AUTO: 0 X 10*3/UL (ref 0.04–0.35)
EOSINOPHIL NFR BLD AUTO: 0 %
ERYTHROCYTE [DISTWIDTH] IN BLOOD BY AUTOMATED COUNT: 4 X 10*6/UL (ref 4.4–5.6)
ERYTHROCYTE [DISTWIDTH] IN BLOOD: 12.4 % (ref 11.5–14.5)
FERRITIN SERPL-MCNC: 1644.6 NG/ML (ref 22–322)
GLOBULIN SER CALC-MCNC: 2.1 G/DL (ref 1.6–3.3)
GLUCOSE SERPL-MCNC: 169 MG/DL (ref 70–110)
HCT VFR BLD AUTO: 38 % (ref 39.6–50)
HGB BLD-MCNC: 13.2 G/DL (ref 13–17)
LDH SPEC-CCNC: 386 U/L (ref 120–246)
LYMPHOCYTES # SPEC AUTO: 0.58 X 10*3/UL (ref 0.9–5)
LYMPHOCYTES NFR SPEC AUTO: 18.6 %
MCH RBC QN AUTO: 33 PG (ref 27–32)
MCHC RBC AUTO-ENTMCNC: 34.7 G/DL (ref 32–37)
MCV RBC AUTO: 95 FL (ref 80–97)
MONOCYTES # BLD AUTO: 0.21 X 10*3/UL (ref 0.2–1)
MONOCYTES NFR BLD AUTO: 6.7 %
NEUTROPHILS # BLD AUTO: 2.31 X 10*3/UL (ref 1.8–7.7)
NEUTROPHILS NFR BLD AUTO: 74.1 %
PLATELET # BLD AUTO: 150 X 10*3/UL (ref 140–440)
POTASSIUM SERPL-SCNC: 4.5 MMOL/L (ref 3.5–5.5)
PROT SERPL-MCNC: 5.8 G/DL (ref 6.2–8.2)
SODIUM SERPL-SCNC: 140 MMOL/L (ref 135–145)
WBC # BLD AUTO: 3.12 X 10*3/UL (ref 4.5–10)

## 2021-04-07 RX ADMIN — DEXTROSE SCH MG: 50 INJECTION, SOLUTION INTRAVENOUS at 09:34

## 2021-04-07 RX ADMIN — ALBUTEROL SULFATE PRN PUFF: 90 AEROSOL, METERED RESPIRATORY (INHALATION) at 11:33

## 2021-04-07 RX ADMIN — PANTOPRAZOLE SODIUM SCH MG: 40 TABLET, DELAYED RELEASE ORAL at 08:05

## 2021-04-07 RX ADMIN — MORPHINE SULFATE SCH MG: 60 TABLET, EXTENDED RELEASE ORAL at 20:22

## 2021-04-07 RX ADMIN — ALBUTEROL SULFATE PRN PUFF: 90 AEROSOL, METERED RESPIRATORY (INHALATION) at 07:57

## 2021-04-07 RX ADMIN — ENOXAPARIN SODIUM SCH MG: 40 INJECTION SUBCUTANEOUS at 09:34

## 2021-04-07 RX ADMIN — ALBUTEROL SULFATE PRN PUFF: 90 AEROSOL, METERED RESPIRATORY (INHALATION) at 17:24

## 2021-04-07 RX ADMIN — MORPHINE SULFATE SCH MG: 60 TABLET, EXTENDED RELEASE ORAL at 09:35

## 2021-04-07 NOTE — P.PN
Subjective


Progress Note Date: 04/07/21





No new complaints, feels overall better than admission.





Objective





- Vital Signs


Vital signs: 


                                   Vital Signs











Temp  98.2 F   04/07/21 11:56


 


Pulse  64   04/07/21 11:56


 


Resp  18   04/07/21 11:56


 


BP  108/62   04/07/21 11:56


 


Pulse Ox  94 L  04/07/21 11:56








                                 Intake & Output











 04/06/21 04/07/21 04/07/21





 18:59 06:59 18:59


 


Output Total  475 


 


Balance  -475 


 


Weight 86.183 kg  


 


Output:   


 


  Urine  475 














- Exam





Gen: awake, alert


HEENT: normocephalic, atraumatic, good hearing acuity, moist mucous membranes


Resp: good air exchange, breathing comfortably with no accessory muscle use


CVS: good distal perfusion x 4,


GI: soft, NTTP, ND


: no SPT, no CVAT, silverio catheter not present


MSK: no pitting edema, no clubbing


Neuro: non-focal, moving all extremities


Psych: cooperative, euthymic mood





- Labs


CBC & Chem 7: 


                                 04/07/21 04:36





                                 04/07/21 04:36


Labs: 


                  Abnormal Lab Results - Last 24 Hours (Table)











  04/06/21 04/06/21 04/06/21 Range/Units





  17:27 18:40 18:40 


 


WBC     (4.50-10.00)  X 10*3/uL


 


RBC  4.22 L    (4.30-5.90)  m/uL


 


Hct  38.9 L    (39.0-53.0)  %


 


MCH     (27.0-32.0)  pg


 


Plt Count  134 L    (150-450)  k/uL


 


D-Dimer   0.79 H   (<0.60)  mg/L FEU


 


Sodium    134 L  (137-145)  mmol/L


 


Potassium    3.2 L  (3.5-5.1)  mmol/L


 


Chloride    97 L  ()  mmol/L


 


Creatinine    0.64 L  (0.66-1.25)  mg/dL


 


Glucose     ()  mg/dL


 


Calcium    8.0 L  (8.4-10.2)  mg/dL


 


Ferritin    1644.6 H  (22.0-322.0)  ng/mL


 


AST     (14-35)  U/L


 


Lactate Dehydrogenase    1068 H  (313-618)  U/L


 


C-Reactive Protein    178.0 H  (<10.0)  mg/L


 


Total Protein    6.1 L  (6.3-8.2)  g/dL


 


Albumin    3.4 L  (3.5-5.0)  g/dL


 


Coronavirus (PCR)     (Not Detectd)  














  04/06/21 04/07/21 04/07/21 Range/Units





  18:40 04:36 04:36 


 


WBC   3.12 L   (4.50-10.00)  X 10*3/uL


 


RBC   4.00 L   (4.30-5.90)  m/uL


 


Hct   38.0 L   (39.0-53.0)  %


 


MCH   33.0 H   (27.0-32.0)  pg


 


Plt Count     (150-450)  k/uL


 


D-Dimer     (<0.60)  mg/L FEU


 


Sodium     (137-145)  mmol/L


 


Potassium     (3.5-5.1)  mmol/L


 


Chloride     ()  mmol/L


 


Creatinine     (0.66-1.25)  mg/dL


 


Glucose    169 H  ()  mg/dL


 


Calcium    8.6 L  (8.4-10.2)  mg/dL


 


Ferritin     (22.0-322.0)  ng/mL


 


AST    47 H  (14-35)  U/L


 


Lactate Dehydrogenase    386 H  (313-618)  U/L


 


C-Reactive Protein     (<10.0)  mg/L


 


Total Protein    5.8 L  (6.3-8.2)  g/dL


 


Albumin    3.70 L  (3.5-5.0)  g/dL


 


Coronavirus (PCR)  Detected A    (Not Detectd)  














Assessment and Plan


Assessment: 





Acute hypoxic respiratory failure


Covid pneumonitis


Mild hypokalemia


Chronic low back pain


Mild thrombocytopenia





Plan


Supportive care


Follow up inflammatory markers


Decadron IV


Supplemental oxygen as needed


Pulmonary consultation


Replace potassium


Follow-up morning labs


Contact and droplet precautions


Resume MS Contin home medications





 


CODE STATUS: Full code


DVT prophylaxis: Lovenox subcu


Discussed with: Patient, ER, RN


Anticipated length of stay more 2 midnights


Anticipated discharge place: home

## 2021-04-08 RX ADMIN — DEXTROSE SCH MG: 50 INJECTION, SOLUTION INTRAVENOUS at 08:39

## 2021-04-08 RX ADMIN — ALBUTEROL SULFATE PRN PUFF: 90 AEROSOL, METERED RESPIRATORY (INHALATION) at 01:19

## 2021-04-08 RX ADMIN — PANTOPRAZOLE SODIUM SCH MG: 40 TABLET, DELAYED RELEASE ORAL at 08:38

## 2021-04-08 RX ADMIN — ALBUTEROL SULFATE PRN PUFF: 90 AEROSOL, METERED RESPIRATORY (INHALATION) at 18:59

## 2021-04-08 RX ADMIN — ALBUTEROL SULFATE PRN PUFF: 90 AEROSOL, METERED RESPIRATORY (INHALATION) at 08:16

## 2021-04-08 RX ADMIN — ALBUTEROL SULFATE PRN PUFF: 90 AEROSOL, METERED RESPIRATORY (INHALATION) at 11:23

## 2021-04-08 RX ADMIN — MORPHINE SULFATE SCH MG: 60 TABLET, EXTENDED RELEASE ORAL at 08:38

## 2021-04-08 RX ADMIN — ALBUTEROL SULFATE PRN PUFF: 90 AEROSOL, METERED RESPIRATORY (INHALATION) at 15:28

## 2021-04-08 RX ADMIN — MORPHINE SULFATE SCH MG: 60 TABLET, EXTENDED RELEASE ORAL at 20:22

## 2021-04-08 RX ADMIN — ENOXAPARIN SODIUM SCH MG: 40 INJECTION SUBCUTANEOUS at 08:38

## 2021-04-08 NOTE — P.PN
Subjective


Progress Note Date: 04/08/21





No new complaints.  Requests a nebulizer to help cough up sputum.





Objective





- Vital Signs


Vital signs: 


                                   Vital Signs











Temp  98.6 F   04/08/21 13:40


 


Pulse  61   04/08/21 13:40


 


Resp  17   04/08/21 13:40


 


BP  118/67   04/08/21 13:40


 


Pulse Ox  93 L  04/08/21 13:40














- Exam





Gen: awake, alert


HEENT: normocephalic, atraumatic, good hearing acuity, moist mucous membranes


Resp: good air exchange, breathing comfortably with no accessory muscle use


CVS: good distal perfusion x 4,


GI: soft, NTTP, ND


: no SPT, no CVAT, silverio catheter not present


MSK: no pitting edema, no clubbing


Neuro: non-focal, moving all extremities


Psych: cooperative, euthymic mood 





- Labs


CBC & Chem 7: 


                                 04/07/21 04:36





                                 04/07/21 04:36


Labs: 


                      Microbiology - Last 24 Hours (Table)











 04/06/21 18:15 Blood Culture - Preliminary





 Blood    No Growth after 24 hours


 


 04/06/21 18:00 Blood Culture - Preliminary





 Blood    No Growth after 24 hours














Assessment and Plan


Assessment: 





Acute hypoxic respiratory failure


Covid pneumonitis


Mild hypokalemia


Chronic low back pain


Mild thrombocytopenia





Plan


Supportive care


Follow up inflammatory markers


Decadron IV


Supplemental oxygen as needed


Pulmonary consultation


Replace potassium


Follow-up morning labs


Contact and droplet precautions


Resume MS Contin home medications





 


CODE STATUS: Full code


DVT prophylaxis: Lovenox subcu


Discussed with: Patient, ER, RN


Anticipated length of stay more 2 midnights


Anticipated discharge place: home

## 2021-04-09 RX ADMIN — ENOXAPARIN SODIUM SCH MG: 40 INJECTION SUBCUTANEOUS at 08:00

## 2021-04-09 RX ADMIN — DEXTROSE SCH MG: 50 INJECTION, SOLUTION INTRAVENOUS at 08:00

## 2021-04-09 RX ADMIN — MORPHINE SULFATE SCH MG: 60 TABLET, EXTENDED RELEASE ORAL at 07:59

## 2021-04-09 RX ADMIN — ALBUTEROL SULFATE PRN PUFF: 90 AEROSOL, METERED RESPIRATORY (INHALATION) at 10:40

## 2021-04-09 RX ADMIN — PANTOPRAZOLE SODIUM SCH MG: 40 TABLET, DELAYED RELEASE ORAL at 08:00

## 2021-04-09 RX ADMIN — MORPHINE SULFATE SCH MG: 60 TABLET, EXTENDED RELEASE ORAL at 20:58

## 2021-04-09 RX ADMIN — ALBUTEROL SULFATE PRN PUFF: 90 AEROSOL, METERED RESPIRATORY (INHALATION) at 07:14

## 2021-04-09 RX ADMIN — ALBUTEROL SULFATE PRN PUFF: 90 AEROSOL, METERED RESPIRATORY (INHALATION) at 16:42

## 2021-04-09 NOTE — P.PN
Subjective


Progress Note Date: 04/09/21





No new complaints today.  Improving overall per patient.





Objective





- Vital Signs


Vital signs: 


                                   Vital Signs











Temp  98.1 F   04/09/21 11:50


 


Pulse  64   04/09/21 11:50


 


Resp  16   04/09/21 11:50


 


BP  121/74   04/09/21 11:50


 


Pulse Ox  97   04/09/21 11:50








                                 Intake & Output











 04/08/21 04/09/21 04/09/21





 18:59 06:59 18:59


 


Intake Total 500  350


 


Balance 500  350


 


Intake:   


 


  Oral 500  350














- Exam





Gen: awake, alert


HEENT: normocephalic, atraumatic, good hearing acuity, moist mucous membranes


Resp: good air exchange, breathing comfortably with no accessory muscle use


CVS: good distal perfusion x 4,


GI: soft, NTTP, ND


: no SPT, no CVAT, silverio catheter not present


MSK: no pitting edema, no clubbing


Neuro: non-focal, moving all extremities


Psych: cooperative, euthymic mood  





- Labs


CBC & Chem 7: 


                                 04/07/21 04:36





                                 04/07/21 04:36


Labs: 


                      Microbiology - Last 24 Hours (Table)











 04/06/21 18:00 Blood Culture - Preliminary





 Blood    No Growth after 48 hours


 


 04/06/21 18:15 Blood Culture - Preliminary





 Blood    No Growth after 48 hours














Assessment and Plan


Assessment: 





Acute hypoxic respiratory failure


Covid pneumonitis


Mild hypokalemia


Chronic low back pain


Mild thrombocytopenia





Plan


Supportive care


Follow up inflammatory markers


Decadron IV


Supplemental oxygen as needed


Pulmonary consultation


Replace potassium


Follow-up morning labs


Contact and droplet precautions


Resume MS Contin home medications





 


CODE STATUS: Full code


DVT prophylaxis: Lovenox subcu


Discussed with: Patient, ER, RN


Anticipated length of stay more 2 midnights


Anticipated discharge place: home

## 2021-04-10 VITALS — RESPIRATION RATE: 22 BRPM | DIASTOLIC BLOOD PRESSURE: 63 MMHG | TEMPERATURE: 98.2 F | SYSTOLIC BLOOD PRESSURE: 111 MMHG

## 2021-04-10 VITALS — HEART RATE: 61 BPM

## 2021-04-10 RX ADMIN — MORPHINE SULFATE SCH MG: 60 TABLET, EXTENDED RELEASE ORAL at 08:49

## 2021-04-10 RX ADMIN — ALBUTEROL SULFATE PRN PUFF: 90 AEROSOL, METERED RESPIRATORY (INHALATION) at 08:42

## 2021-04-10 RX ADMIN — PANTOPRAZOLE SODIUM SCH MG: 40 TABLET, DELAYED RELEASE ORAL at 08:49

## 2021-04-10 RX ADMIN — DEXTROSE SCH MG: 50 INJECTION, SOLUTION INTRAVENOUS at 08:50

## 2021-04-10 RX ADMIN — ALBUTEROL SULFATE PRN PUFF: 90 AEROSOL, METERED RESPIRATORY (INHALATION) at 11:21

## 2021-04-10 RX ADMIN — ENOXAPARIN SODIUM SCH MG: 40 INJECTION SUBCUTANEOUS at 08:50

## 2021-04-10 NOTE — P.DS
Providers


Date of admission: 


04/06/21 19:41





Expected date of discharge: 04/10/21


Attending physician: 


Josh Ritter MD





Primary care physician: 


Li Hills





[facility] Course: 





Acute hypoxic respiratory failure


Covid pneumonitis


Mild hypokalemia


Chronic low back pain


Mild thrombocytopenia





61 year old man with history of chronic low back pain was tested positive for 

COVID and had mild oxygen requirement.  He was provided supportive care, and 

started on dexamethasone 6mg IV daily.  He recovered back to room air within 2 

days, and was discharged home in stable condition.  He did not require 

remdesivir, plasma, or actemra.  He was prescribed Vit C/D, Zn on discharge for 

14 days.  Pt will f/u with PCP.








Assessment: 


Gen: awake, alert


HEENT: normocephalic, atraumatic, good hearing acuity, moist mucous membranes


Resp: good air exchange, breathing comfortably with no accessory muscle use


CVS: good distal perfusion x 4,


GI: soft, NTTP, ND


: no SPT, no CVAT, silverio catheter not present


MSK: no pitting edema, no clubbing


Neuro: non-focal, moving all extremities


Psych: cooperative, euthymic mood  


Patient Condition at Discharge: Good





Plan - Discharge Summary


Discharge Rx Participant: Yes


New Discharge Prescriptions: 


New


   Zinc 100 mg PO DAILY #28 tablet


   Ascorbic Acid [Vitamin C] 1,000 mg PO DAILY #14 tablet


   Cholecalciferol [Vitamin D3 (25 Mcg = 1000 Iu)] 25 mcg PO DAILY #14 tab





Continue


   Omeprazole [PriLOSEC] 20 mg PO DAILY


   Ibuprofen 600 mg PO TID PRN


     PRN Reason: Pain


   Morphine Sulfate ER [Ms Contin] 60 mg PO BID


   oxyCODONE ER [OxyCONTIN] 20 mg PO Q12HR PRN


     PRN Reason: Breakthrough Pain


   guaiFENesin [Mucinex] 600 mg PO BID PRN


     PRN Reason: Congestion


   Albuterol Sulfate [Proventil Hfa] 2 puff INHALATION Q4HR PRN


     PRN Reason: Shortness Of Breath


Discharge Medication List





Omeprazole [PriLOSEC] 20 mg PO DAILY 06/18/14 [History]


Ibuprofen 600 mg PO TID PRN 04/20/15 [History]


Morphine Sulfate ER [Ms Contin] 60 mg PO BID 07/21/18 [History]


oxyCODONE ER [OxyCONTIN] 20 mg PO Q12HR PRN 11/20/18 [History]


Albuterol Sulfate [Proventil Hfa] 2 puff INHALATION Q4HR PRN 04/06/21 [History]


guaiFENesin [Mucinex] 600 mg PO BID PRN 04/06/21 [History]


Ascorbic Acid [Vitamin C] 1,000 mg PO DAILY #14 tablet 04/10/21 [Rx]


Cholecalciferol [Vitamin D3 (25 Mcg = 1000 Iu)] 25 mcg PO DAILY #14 tab 04/10/21

[Rx]


Zinc 100 mg PO DAILY #28 tablet 04/10/21 [Rx]








Follow up Appointment(s)/Referral(s): 


Li Hills MD [Primary Care Provider] - 1-2 days (office close at time of

discharge. Please call to make appointment)


Discharge Disposition: HOME SELF-CARE
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
## 2022-10-17 ENCOUNTER — HOSPITAL ENCOUNTER (OUTPATIENT)
Dept: HOSPITAL 47 - RADCTMAIN | Age: 62
Discharge: HOME | End: 2022-10-17
Attending: FAMILY MEDICINE
Payer: COMMERCIAL

## 2022-10-17 DIAGNOSIS — Z87.442: ICD-10-CM

## 2022-10-17 DIAGNOSIS — R10.9: Primary | ICD-10-CM

## 2022-10-17 PROCEDURE — 74400 UROGRAPHY IV +-KUB TOMOG: CPT

## 2022-10-17 PROCEDURE — 74178 CT ABD&PLV WO CNTR FLWD CNTR: CPT

## 2022-10-17 NOTE — CT
EXAMINATION TYPE: CT urogram wo/w con

 

DATE OF EXAM: 10/17/2022

 

COMPARISON: Prior CT February 8, 2020 and older CTs

 

HISTORY: Bilateral flank pain

 

CT DLP: 3451.2 mGycm, Automated Exposure Control for Dose Reduction was Utilized.

 

CONTRAST: 

CT scan of the abdomen and pelvis is performed without oral and without and with IV Contrast, patient
 injected with 84ml mL of Isovue 300. Urogram protocol with 3-D reconstructed images created on an in
dependent workstation and reviewed.

 

FINDINGS:

 

KUB: Noncontrast images show probable 1 to 2 mm nonobstructing calculus left kidney axial image 46 mi
dpole level. No right-sided renal calculus. There is symmetric cortical medullary uptake and excretio
n without concerning solid or cystic renal mass or hydronephrosis seen in either kidney on current st
udy. Visualized portion of both ureters show no obstructing mass or calculus currently. No intralumin
al mass or calculus in the urinary bladder.

 

LUNG BASES: At least moderate coronary artery calcification is identified. Elevated left hemidiaphrag
m is seen. Mild Cardiomegaly.

 

LIVER/GB: Visualized liver is heterogeneously hypodense consistent with diffuse fatty infiltration. C
holecystectomy clips are redemonstrated..

 

PANCREAS: Moderate fat replaced atrophy in the pancreatic head redemonstrated.

 

SPLEEN: Anterior positioning of spleen redemonstrated.

 

ADRENALS:  No significant abnormality is seen.

 

BOWEL:   No significant abnormality is seen.

 

PROSTATE/SEMINAL VESICLES: Normal size prostate.

 

LYMPH NODES: Mild mesenteric fat stranding is again seen.

 

OSSEOUS STRUCTURES: Moderate multilevel spurring in the spine. Moderate to severe disc space narrowin
g L5-S1 level. Mild to moderate disc space narrowing and spurring right L2-L3 level.

 

OTHER:  No significant additional abnormality is seen.

 

IMPRESSION: New 1 to 2 mm nonobstructing mid pole left renal calculus. Source of patient's bilateral 
flank pain not identified. Jordana mesentery appearance redemonstrated.

## 2022-11-01 ENCOUNTER — HOSPITAL ENCOUNTER (OUTPATIENT)
Dept: HOSPITAL 47 - RADMRIMAIN | Age: 62
Discharge: HOME | End: 2022-11-01
Attending: PHYSICAL MEDICINE & REHABILITATION
Payer: COMMERCIAL

## 2022-11-01 DIAGNOSIS — M48.061: ICD-10-CM

## 2022-11-01 DIAGNOSIS — M51.16: Primary | ICD-10-CM

## 2022-11-01 PROCEDURE — 72158 MRI LUMBAR SPINE W/O & W/DYE: CPT

## 2022-11-01 NOTE — MR
EXAMINATION TYPE: MR lumbar spine wo/w con

 

DATE OF EXAM: 11/1/2022

 

COMPARISON: 1915

 

HISTORY: Low back pain that radiates down left and right leg

 

CONTRAST: 9 mL Gadavist

 

TECHNIQUE: T1 and T2  axial and sagittal, postcontrast T1 axial and sagittal images of the lumbar spi
ne are submitted.  

 

FINDINGS: There is no abnormal signal seen within the visualized spinal cord or paraspinal soft tissu
es.

 

At L1-2 there is degenerative disc disease with minimal right paracentral disc bulging and mild effac
ement of thecal sac. No discrete herniation or canal stenosis. Neural foramina are maintained. Mild h
ypertrophic changes since

 

At L2-3 there is moderate to severe degenerative disc disease with diffuse disc bulging and hypertrop
hic change of facets and ligamentum flavum. Mild central stenosis and mild-to-moderate bilateral fora
marshall enlargement. Discogenic marrow changes are stable. There is a 1 to 2 mm retrolisthesis of L2 re
lative to L3.

 

At L3-4 there is facet arthropathy with ligamentum flavum hypertrophy. There is a small focal central
 disc protrusion with mild effacement of thecal sac. Borderline to mild central stenosis. Neural fora
johan remain patent

 

At L4-5 there is facet arthropathy appears advanced ligamentum flavum hypertrophy. Small focal centra
l disc herniation mildly effacing the thecal sac. There is mild bilateral foraminal protrusion and mi
ld central stenosis.

 

At L5-S1 there is vacuum disc compatible severe degenerative disc disease. Broad-based central disc p
rotrusion. Diminutive spinal canal with facet arthropathy and ligamentum flavum projecting. Findings 
contribute to mild canal stenosis and moderate to severe bilateral foraminal encroachment. Findings a
re suspicious for a right paracentral small extruded disc fragment measuring 6 x 5 mm posterior to th
e lower margin of the L5 vertebral segment.

 

 

 

IMPRESSION:

1. Multilevel degenerative disc disease with vacuum disc and severe changes L2-3 and L5-S1. Multileve
l mild central stenosis due to hypertrophic changes of the facets and ligamentum flavum and disc bulg
ing.

2. Disc protrusion or small herniation centrally at L3-4, L4-5 and L5-S1 stable from prior exam with 
mild effacement of thecal sac. However, findings are suspicious for a right paracentral small extrude
d disc fragment measuring 6 x 5 mm posterior to the lower margin of the L5 vertebral segment.

3. Multilevel foraminal encroachment as discussed above. Findings most marked bilaterally L5-S1 moder
ate to severe bilateral foraminal

## 2023-11-22 NOTE — CT
EXAMINATION TYPE: CT abdomen pelvis wo con

 

DATE OF EXAM: 1/11/2020

 

COMPARISON: Previous study dated 11/30/2018.

 

HISTORY: Back pain

 

CT DLP: 958.9 mGycm

Automated exposure control for dose reduction was used.

 

FINDINGS: Visualized portions of the lungs are clear. There is no pleural or pericardial fluid. The h
eart is not enlarged.

 

Within the abdomen, the gallbladder is been removed. The liver and spleen appear normal.

 

Both adrenal glands appear normal.

 

Mild right-sided hydronephrosis. There is a 3 mm calcification at the level of the UVJ on the right.

 

There is a 5 mm nonobstructing calculus in the posterior pole calyx on the left.

 

The pancreas is unremarkable.

 

There is no significant retroperitoneal, iliac or inguinal adenopathy.

 

Bladder is not distended.

 

There is no significant diverticular change is no radiographic evidence of diverticulitis. The append
ix is not seen with certainty.

 

Small bowel loops are of normal caliber.

 

There is no free fluid and no free air identified.

 

There is degenerative disc disease and hypertrophic spondylosis throughout the spine.

 

IMPRESSION: 

1. DISTAL RIGHT URETERIC CALCULUS CAUSING MILD TO MODERATE HYDRONEPHROSIS AND HYDROURETER ON THE RIGH
T.

2. NONOBSTRUCTING LEFT RENAL CALCULUS.

3. DEGENERATIVE CHANGE WITHIN THE SPINE. Primary Defect Length In Cm (Final Defect Size - Required For Flaps/Grafts): 2